# Patient Record
Sex: FEMALE | Race: WHITE
[De-identification: names, ages, dates, MRNs, and addresses within clinical notes are randomized per-mention and may not be internally consistent; named-entity substitution may affect disease eponyms.]

---

## 2017-04-06 ENCOUNTER — HOSPITAL ENCOUNTER (OUTPATIENT)
Dept: HOSPITAL 61 - OPS | Age: 72
Discharge: HOME | End: 2017-04-06
Attending: FAMILY MEDICINE
Payer: COMMERCIAL

## 2017-04-06 VITALS — DIASTOLIC BLOOD PRESSURE: 59 MMHG | SYSTOLIC BLOOD PRESSURE: 121 MMHG

## 2017-04-06 DIAGNOSIS — E78.2: ICD-10-CM

## 2017-04-06 DIAGNOSIS — Z13.1: Primary | ICD-10-CM

## 2017-04-06 DIAGNOSIS — I10: ICD-10-CM

## 2017-04-06 PROCEDURE — 96365 THER/PROPH/DIAG IV INF INIT: CPT

## 2017-10-19 ENCOUNTER — HOSPITAL ENCOUNTER (OUTPATIENT)
Dept: HOSPITAL 61 - CCL | Age: 72
Discharge: HOME | End: 2017-10-19
Attending: INTERNAL MEDICINE
Payer: COMMERCIAL

## 2017-10-19 VITALS — DIASTOLIC BLOOD PRESSURE: 58 MMHG | SYSTOLIC BLOOD PRESSURE: 140 MMHG

## 2017-10-19 VITALS — SYSTOLIC BLOOD PRESSURE: 124 MMHG | DIASTOLIC BLOOD PRESSURE: 55 MMHG

## 2017-10-19 VITALS — DIASTOLIC BLOOD PRESSURE: 52 MMHG | SYSTOLIC BLOOD PRESSURE: 124 MMHG

## 2017-10-19 VITALS — DIASTOLIC BLOOD PRESSURE: 53 MMHG | SYSTOLIC BLOOD PRESSURE: 112 MMHG

## 2017-10-19 VITALS — SYSTOLIC BLOOD PRESSURE: 118 MMHG | DIASTOLIC BLOOD PRESSURE: 58 MMHG

## 2017-10-19 VITALS — BODY MASS INDEX: 20.43 KG/M2 | HEIGHT: 62 IN | WEIGHT: 111 LBS

## 2017-10-19 VITALS — DIASTOLIC BLOOD PRESSURE: 54 MMHG | SYSTOLIC BLOOD PRESSURE: 106 MMHG

## 2017-10-19 VITALS — SYSTOLIC BLOOD PRESSURE: 114 MMHG | DIASTOLIC BLOOD PRESSURE: 59 MMHG

## 2017-10-19 VITALS — SYSTOLIC BLOOD PRESSURE: 145 MMHG | DIASTOLIC BLOOD PRESSURE: 62 MMHG

## 2017-10-19 VITALS — DIASTOLIC BLOOD PRESSURE: 54 MMHG | SYSTOLIC BLOOD PRESSURE: 116 MMHG

## 2017-10-19 VITALS — SYSTOLIC BLOOD PRESSURE: 105 MMHG | DIASTOLIC BLOOD PRESSURE: 58 MMHG

## 2017-10-19 VITALS — SYSTOLIC BLOOD PRESSURE: 133 MMHG | DIASTOLIC BLOOD PRESSURE: 58 MMHG

## 2017-10-19 VITALS — DIASTOLIC BLOOD PRESSURE: 60 MMHG | SYSTOLIC BLOOD PRESSURE: 114 MMHG

## 2017-10-19 DIAGNOSIS — J44.9: ICD-10-CM

## 2017-10-19 DIAGNOSIS — E78.00: ICD-10-CM

## 2017-10-19 DIAGNOSIS — E78.5: ICD-10-CM

## 2017-10-19 DIAGNOSIS — F17.200: ICD-10-CM

## 2017-10-19 DIAGNOSIS — I70.213: Primary | ICD-10-CM

## 2017-10-19 DIAGNOSIS — Z72.0: ICD-10-CM

## 2017-10-19 DIAGNOSIS — Z96.652: ICD-10-CM

## 2017-10-19 DIAGNOSIS — I10: ICD-10-CM

## 2017-10-19 LAB
ANION GAP SERPL CALC-SCNC: 8 MMOL/L (ref 6–14)
BUN SERPL-MCNC: 24 MG/DL (ref 7–20)
CALCIUM SERPL-MCNC: 9.7 MG/DL (ref 8.5–10.1)
CHLORIDE SERPL-SCNC: 103 MMOL/L (ref 98–107)
CO2 SERPL-SCNC: 31 MMOL/L (ref 21–32)
CREAT SERPL-MCNC: 1.1 MG/DL (ref 0.6–1)
ERYTHROCYTE [DISTWIDTH] IN BLOOD BY AUTOMATED COUNT: 14.1 % (ref 11.5–14.5)
GFR SERPLBLD BASED ON 1.73 SQ M-ARVRAT: 48.6 ML/MIN
GLUCOSE SERPL-MCNC: 89 MG/DL (ref 70–99)
HCT VFR BLD CALC: 43.6 % (ref 36–47)
HGB BLD-MCNC: 14.6 G/DL (ref 12–15.5)
INR PPP: 1 (ref 0.8–1.1)
MCH RBC QN AUTO: 31 PG (ref 25–35)
MCHC RBC AUTO-ENTMCNC: 34 G/DL (ref 31–37)
MCV RBC AUTO: 92 FL (ref 79–100)
PLATELET # BLD AUTO: 234 X10^3/UL (ref 140–400)
POTASSIUM SERPL-SCNC: 4.2 MMOL/L (ref 3.5–5.1)
PROTHROMBIN TIME: 12.1 SEC (ref 11.7–14)
RBC # BLD AUTO: 4.73 X10^6/UL (ref 3.5–5.4)
SODIUM SERPL-SCNC: 142 MMOL/L (ref 136–145)
WBC # BLD AUTO: 7.1 X10^3/UL (ref 4–11)

## 2017-10-19 PROCEDURE — C1769 GUIDE WIRE: HCPCS

## 2017-10-19 PROCEDURE — 99152 MOD SED SAME PHYS/QHP 5/>YRS: CPT

## 2017-10-19 PROCEDURE — 36415 COLL VENOUS BLD VENIPUNCTURE: CPT

## 2017-10-19 PROCEDURE — 85610 PROTHROMBIN TIME: CPT

## 2017-10-19 PROCEDURE — 85347 COAGULATION TIME ACTIVATED: CPT

## 2017-10-19 PROCEDURE — 85027 COMPLETE CBC AUTOMATED: CPT

## 2017-10-19 PROCEDURE — C1892 INTRO/SHEATH,FIXED,PEEL-AWAY: HCPCS

## 2017-10-19 PROCEDURE — 80048 BASIC METABOLIC PNL TOTAL CA: CPT

## 2017-10-19 PROCEDURE — 99153 MOD SED SAME PHYS/QHP EA: CPT

## 2017-10-19 PROCEDURE — 75630 X-RAY AORTA LEG ARTERIES: CPT

## 2017-10-19 NOTE — CARD
--------------- APPROVED REPORT --------------





Patient StatusOUT-PATIENT

Cardiac Technologist:      Mischelle L Rozine, RT (R)

Procedure(s) performed: Moderate Sedation: 63 Minutes

Aortogram with bilateral iliofemoral run-off

External iliac, common femoral catheter placement (bilateral)



HISTORY

The patient is a 72 year-old female with a history of : hypertension, dyslipidemia.



INDICATION FOR PROCEDURE

The indication(s) include : Unilateral claudication: Right.



PROCEDURE NARRATIVE

After appropriate informed consent, the patient was brought to the cath lab and placed in the supine 
position. Preprocedural timeout was completed and confirmed the right patient and procedure. The bila
teral groins were prepped and draped in usual sterile fashion. Moderate sedation acheived with Fentan
yl and Versed. The patient received 2000 units of Heparin for anticoagulation. 



Access: Under lidocaine local anesthesia, a 5Fr introducer sheath was placed in the LCFA via the mandie
fied seldinger technique with a J-tipped guidewire and an 18g needle. Diagnostic angiography was then
 performed using a 5Fr Omniflush catheter with digital subtraction angiography. Next, the contralater
al (RCFA) was accessed with the aid of the Omniflush catheter and a J-tipped guidewire. The omniflush
 was then exchanged for a long angled glide catheter which was then placed in the RCFA. Repeat right 
lower extremity with DSA was performed. Subsequently, the glidecatheter was used to do a pullback man
euver on the AIDA stenosis. No significant stenosis was identified at the levels of the common iliac 
arteries and the left external iliac artery. 



FINDINGS: 

AO: 154/86



AORTA: Moderate adventitial calcification with a focal saccular aneurysm extending over the RCIA. 

RENAL arteries: Single right and left renal arteries were identified without significant disease. 

RCIA: Mild diffuse irregularities of upto 20%. 

REIA: No significant disease. 

RIIA: No significant disease. 

RCFA: No significant disease. 

RSFA: Distal SFA occlusion, the remainder of the vessel is small in caliber but no high grade disease
 is identified. 

RPOP: Occlusion at the P1 segment of the popliteal artery with reconstitution via geniculate and medi
al thigh collaterals at the P2 segment. 

RAT: No significant disease. 

RTP trunk: No significant disease. 



LCIA: Mild diffuse irregularities of upto 20%. 

AIDA: There is an ostial/proximal 50% eccentric stenosis. *No significant pullback gradient across th
e lesion, approximately 12 mm Hg. 

LIIA: Has an ostial/proximal 50% stenosis. 

LCFA: No significant disease. 

LSFA: No significant disease. 

LPOP: No significant disease. 

LAT: No significant disease. 

LTP trunk: No significant disease. 



At case completion, the left sided sheath was removed via manual compression. There were no acute com
plications. 



Conclusion

1. Lund category 3 claudication (R>L)

2. RSFA/P1 segment occlusion, moderate left sided external iliac disease



Recommendations

Plan for staged PVI with a pedal approach for treatment of RSFA disease given location of stenosis an
d CKD.

## 2017-10-31 ENCOUNTER — HOSPITAL ENCOUNTER (OUTPATIENT)
Dept: HOSPITAL 61 - CCL | Age: 72
Discharge: HOME | End: 2017-10-31
Attending: INTERNAL MEDICINE
Payer: COMMERCIAL

## 2017-10-31 VITALS — SYSTOLIC BLOOD PRESSURE: 118 MMHG | DIASTOLIC BLOOD PRESSURE: 58 MMHG

## 2017-10-31 VITALS — DIASTOLIC BLOOD PRESSURE: 49 MMHG | SYSTOLIC BLOOD PRESSURE: 98 MMHG

## 2017-10-31 VITALS — WEIGHT: 111 LBS | HEIGHT: 62 IN | BODY MASS INDEX: 20.43 KG/M2

## 2017-10-31 VITALS — SYSTOLIC BLOOD PRESSURE: 114 MMHG | DIASTOLIC BLOOD PRESSURE: 61 MMHG

## 2017-10-31 VITALS — DIASTOLIC BLOOD PRESSURE: 61 MMHG | SYSTOLIC BLOOD PRESSURE: 114 MMHG

## 2017-10-31 VITALS — SYSTOLIC BLOOD PRESSURE: 107 MMHG | DIASTOLIC BLOOD PRESSURE: 75 MMHG

## 2017-10-31 VITALS — DIASTOLIC BLOOD PRESSURE: 52 MMHG | SYSTOLIC BLOOD PRESSURE: 119 MMHG

## 2017-10-31 VITALS — SYSTOLIC BLOOD PRESSURE: 106 MMHG | DIASTOLIC BLOOD PRESSURE: 60 MMHG

## 2017-10-31 VITALS — DIASTOLIC BLOOD PRESSURE: 47 MMHG | SYSTOLIC BLOOD PRESSURE: 95 MMHG

## 2017-10-31 VITALS — DIASTOLIC BLOOD PRESSURE: 52 MMHG | SYSTOLIC BLOOD PRESSURE: 111 MMHG

## 2017-10-31 VITALS — DIASTOLIC BLOOD PRESSURE: 58 MMHG | SYSTOLIC BLOOD PRESSURE: 102 MMHG

## 2017-10-31 DIAGNOSIS — E78.00: ICD-10-CM

## 2017-10-31 DIAGNOSIS — I70.213: Primary | ICD-10-CM

## 2017-10-31 DIAGNOSIS — I10: ICD-10-CM

## 2017-10-31 DIAGNOSIS — J44.9: ICD-10-CM

## 2017-10-31 DIAGNOSIS — Z96.652: ICD-10-CM

## 2017-10-31 DIAGNOSIS — F17.200: ICD-10-CM

## 2017-10-31 LAB
ANION GAP SERPL CALC-SCNC: 8 MMOL/L (ref 6–14)
BUN SERPL-MCNC: 28 MG/DL (ref 7–20)
CALCIUM SERPL-MCNC: 9.6 MG/DL (ref 8.5–10.1)
CHLORIDE SERPL-SCNC: 103 MMOL/L (ref 98–107)
CO2 SERPL-SCNC: 29 MMOL/L (ref 21–32)
CREAT SERPL-MCNC: 1.4 MG/DL (ref 0.6–1)
ERYTHROCYTE [DISTWIDTH] IN BLOOD BY AUTOMATED COUNT: 14.3 % (ref 11.5–14.5)
GFR SERPLBLD BASED ON 1.73 SQ M-ARVRAT: 36.8 ML/MIN
GLUCOSE SERPL-MCNC: 91 MG/DL (ref 70–99)
HCT VFR BLD CALC: 41.7 % (ref 36–47)
HGB BLD-MCNC: 14 G/DL (ref 12–15.5)
INR PPP: 1 (ref 0.8–1.1)
MCH RBC QN AUTO: 31 PG (ref 25–35)
MCHC RBC AUTO-ENTMCNC: 34 G/DL (ref 31–37)
MCV RBC AUTO: 92 FL (ref 79–100)
PLATELET # BLD AUTO: 259 X10^3/UL (ref 140–400)
POTASSIUM SERPL-SCNC: 3.7 MMOL/L (ref 3.5–5.1)
PROTHROMBIN TIME: 12.6 SEC (ref 11.7–14)
RBC # BLD AUTO: 4.53 X10^6/UL (ref 3.5–5.4)
SODIUM SERPL-SCNC: 140 MMOL/L (ref 136–145)
WBC # BLD AUTO: 9.9 X10^3/UL (ref 4–11)

## 2017-10-31 PROCEDURE — 85610 PROTHROMBIN TIME: CPT

## 2017-10-31 PROCEDURE — 99153 MOD SED SAME PHYS/QHP EA: CPT

## 2017-10-31 PROCEDURE — C1885 CATH, TRANSLUMIN ANGIO LASER: HCPCS

## 2017-10-31 PROCEDURE — C2623 CATH, TRANSLUMIN, DRUG-COAT: HCPCS

## 2017-10-31 PROCEDURE — 85027 COMPLETE CBC AUTOMATED: CPT

## 2017-10-31 PROCEDURE — 36415 COLL VENOUS BLD VENIPUNCTURE: CPT

## 2017-10-31 PROCEDURE — 37224: CPT

## 2017-10-31 PROCEDURE — 80048 BASIC METABOLIC PNL TOTAL CA: CPT

## 2017-10-31 PROCEDURE — C1769 GUIDE WIRE: HCPCS

## 2017-10-31 PROCEDURE — C1892 INTRO/SHEATH,FIXED,PEEL-AWAY: HCPCS

## 2017-10-31 PROCEDURE — C1876 STENT, NON-COA/NON-COV W/DEL: HCPCS

## 2017-10-31 PROCEDURE — 99152 MOD SED SAME PHYS/QHP 5/>YRS: CPT

## 2017-10-31 PROCEDURE — 37226: CPT

## 2017-10-31 NOTE — PDOC
MODERATE SEDATION ASSESSMENT


RISKS/ALTERNATIVES


Risks/Alternatives


Risks and alternatives of this type of sedation and procedure discussed with:


RISK/ALTERNATIVES:  Patient





H & P ON CHART


H & P


H & P on chart and reviewed for co-morbid conditions and appropriate labs.


H&P ON CHART:  Yes





PREGNANCY STATUS


PREG STATUS ASSESSED:  N/A





MEDS/ALLERGIES REVIEWED


Meds/Allergies Reviewed


Medications and Allergies including time and route of recently administered 

narcotics and sedatives.


MEDS/ALLERGIES REVIEWED:  Yes





ASA RATING


ASA RATING:  II





AIRWAY ASSESSMENT


Airway Assessment


Airway patency, oral function limitations, presence  of caps, crowns, dentures, 

partials, and ability to extend neck assessed.


AIRWAY ASSESSMENT:  Yes





MALLAMPATI SCORE


MALLAMPATI SCORE:  II





PRE-SEDATION ASSESSMENT


PRE-SEDATION ASSESSMENT:  Yes











LANEY HENSLEY MD Oct 31, 2017 10:48

## 2017-10-31 NOTE — CARD
--------------- APPROVED REPORT --------------





Procedure(s) performed: SEDATION TIME 90 MINUTES

Fem-pop PTA with stent placement



HISTORY

The patient is a 72 year-old female with a history of : dyslipidemia.



INDICATION

The indication(s) include : Bilateral (R>L) selena category 3 claudication. .



PROCEDURE NARRATIVE

After appropriate informed consent, the patient was brought to the cath lab. The right foot was prepp
ed and draped in usual sterile fashion. 



Under 2% lidocaine local anesthesia, a 5Fr micropuncture kit was used to enter the DP artery with ult
rasound guidance. Next, a 5Fr sheath was placed and 4000 units of heparin was given. Using a short 60
 cm angled glide catheter and a glidewire the distal SFA occlusion was crossed. 



Diagnostic angiography confirmed intraluminal placement. Next, the glidewire was exchanged for a Comm
and 0.018 Wire. 



Balloon angioplasty was then performed for 3 minutes each with a 4.0/40 and 5.0/40 mm balloons. Post-
angioplasty angiography revealed a non-flow limiting dissection. Next, the lesion was angioplastied w
ith a InPACT Paclitaxel coated balloon (5.0/60mm) at nominal pressures. Due to persistent dissection,
 a Supera 5.0/60 mm stent was used to cover the dissection and post-dilated with a 5.0/40 mm balloon 
at 10 ramon. 



Post-PVI angiography revealed excellent stent expansion. There was wire induced pseudostenosis in the
 P3 segment of the popliteal artery, which was then resolved with NTG and removal of the guide wire. 
The distal run-off was widely patent showing 3 vessel brisk flow. Multiple doses of NTG and Verapamil
 were given to help prevent vasospasm at the access site. 



Patient was given 600mg of Plavix at case completion and the sheath was removed with placement of a T
erumo radial band and hemostasis was achieved with good capillay refill 



Conclusion

1. Successful  recanalization of the right distal SFA/P1 segment via the pedal approach. 

2. Lesion treated with 5.0/60 Paclitaxel coated balloon and stented with a 5.0/60 mm Supera deployed 
at approximately nominal length. 



Recommendations

ASA 81mg daily

Plavix 75mg daily

## 2018-01-15 ENCOUNTER — HOSPITAL ENCOUNTER (OUTPATIENT)
Dept: HOSPITAL 61 - KCIC | Age: 73
Discharge: HOME | End: 2018-01-15
Attending: PHYSICIAN ASSISTANT
Payer: COMMERCIAL

## 2018-01-15 DIAGNOSIS — M54.32: ICD-10-CM

## 2018-01-15 DIAGNOSIS — G57.91: ICD-10-CM

## 2018-01-15 DIAGNOSIS — M54.31: Primary | ICD-10-CM

## 2018-01-15 PROCEDURE — 72110 X-RAY EXAM L-2 SPINE 4/>VWS: CPT

## 2018-03-09 ENCOUNTER — HOSPITAL ENCOUNTER (OUTPATIENT)
Dept: HOSPITAL 61 - US | Age: 73
Discharge: HOME | End: 2018-03-09
Attending: INTERNAL MEDICINE
Payer: COMMERCIAL

## 2018-03-09 DIAGNOSIS — R09.89: Primary | ICD-10-CM

## 2018-03-09 PROCEDURE — 93880 EXTRACRANIAL BILAT STUDY: CPT

## 2018-06-19 ENCOUNTER — HOSPITAL ENCOUNTER (OUTPATIENT)
Dept: HOSPITAL 61 - KCIC US | Age: 73
Discharge: HOME | End: 2018-06-19
Attending: INTERNAL MEDICINE
Payer: COMMERCIAL

## 2018-06-19 DIAGNOSIS — I70.213: Primary | ICD-10-CM

## 2018-06-19 PROCEDURE — 93925 LOWER EXTREMITY STUDY: CPT

## 2018-06-25 ENCOUNTER — HOSPITAL ENCOUNTER (OUTPATIENT)
Dept: HOSPITAL 61 - CCL | Age: 73
Discharge: HOME | End: 2018-06-25
Attending: INTERNAL MEDICINE
Payer: COMMERCIAL

## 2018-06-25 DIAGNOSIS — I70.213: Primary | ICD-10-CM

## 2018-06-25 DIAGNOSIS — J44.9: ICD-10-CM

## 2018-06-25 DIAGNOSIS — I12.9: ICD-10-CM

## 2018-06-25 DIAGNOSIS — Z86.010: ICD-10-CM

## 2018-06-25 DIAGNOSIS — Z98.890: ICD-10-CM

## 2018-06-25 DIAGNOSIS — M81.0: ICD-10-CM

## 2018-06-25 DIAGNOSIS — Z96.652: ICD-10-CM

## 2018-06-25 DIAGNOSIS — Z79.01: ICD-10-CM

## 2018-06-25 DIAGNOSIS — I70.92: ICD-10-CM

## 2018-06-25 DIAGNOSIS — E78.00: ICD-10-CM

## 2018-06-25 DIAGNOSIS — F32.9: ICD-10-CM

## 2018-06-25 DIAGNOSIS — N18.3: ICD-10-CM

## 2018-06-25 LAB
ANION GAP SERPL CALC-SCNC: 9 MMOL/L (ref 6–14)
BLOOD UREA NITROGEN: 33 MG/DL (ref 7–20)
CALCIUM: 9.8 MG/DL (ref 8.5–10.1)
CHLORIDE: 104 MMOL/L (ref 98–107)
CO2 SERPL-SCNC: 28 MMOL/L (ref 21–32)
CREAT SERPL-MCNC: 1.3 MG/DL (ref 0.6–1)
GFR SERPLBLD BASED ON 1.73 SQ M-ARVRAT: 40 ML/MIN
GLUCOSE SERPL-MCNC: 94 MG/DL (ref 70–99)
HCG SERPL-ACNC: 9.8 X10^3/UL (ref 4–11)
HEMATOCRIT: 42.3 % (ref 36–47)
HEMOGLOBIN: 14.3 G/DL (ref 12–15.5)
INR: 0.9 (ref 0.8–1.1)
MEAN CORPUSCULAR HEMOGLOBIN: 32 PG (ref 25–35)
MEAN CORPUSCULAR HGB CONC: 34 G/DL (ref 31–37)
MEAN CORPUSCULAR VOLUME: 93 FL (ref 79–100)
PARTIAL THROMBOPLASTIN TIME: 33 SEC (ref 24–38)
PLATELET COUNT: 238 X10^3/UL (ref 140–400)
POTASSIUM SERPL-SCNC: 4.1 MMOL/L (ref 3.5–5.1)
PROTHROMBIN TIME PATIENT: 12 SEC (ref 11.7–14)
RED BLOOD COUNT: 4.53 X10^6/UL (ref 3.5–5.4)
RED CELL DISTRIBUTION WIDTH: 14.4 % (ref 11.5–14.5)
SODIUM: 141 MMOL/L (ref 136–145)

## 2018-06-25 PROCEDURE — 85347 COAGULATION TIME ACTIVATED: CPT

## 2018-06-25 PROCEDURE — 85610 PROTHROMBIN TIME: CPT

## 2018-06-25 PROCEDURE — 99153 MOD SED SAME PHYS/QHP EA: CPT

## 2018-06-25 PROCEDURE — 75716 ARTERY X-RAYS ARMS/LEGS: CPT

## 2018-06-25 PROCEDURE — 80048 BASIC METABOLIC PNL TOTAL CA: CPT

## 2018-06-25 PROCEDURE — 85730 THROMBOPLASTIN TIME PARTIAL: CPT

## 2018-06-25 PROCEDURE — 36415 COLL VENOUS BLD VENIPUNCTURE: CPT

## 2018-06-25 PROCEDURE — 36246 INS CATH ABD/L-EXT ART 2ND: CPT

## 2018-06-25 PROCEDURE — 85027 COMPLETE CBC AUTOMATED: CPT

## 2018-06-25 PROCEDURE — 75630 X-RAY AORTA LEG ARTERIES: CPT

## 2018-06-25 PROCEDURE — 99152 MOD SED SAME PHYS/QHP 5/>YRS: CPT

## 2018-06-25 RX ADMIN — HEPARIN SODIUM IN SODIUM CHLORIDE 1 UNIT: 200 INJECTION INTRAVENOUS at 08:57

## 2018-06-25 RX ADMIN — BACITRACIN 1 MLS/HR: 5000 INJECTION, POWDER, FOR SOLUTION INTRAMUSCULAR at 07:00

## 2018-06-25 RX ADMIN — MIDAZOLAM HYDROCHLORIDE 1 MG: 1 INJECTION, SOLUTION INTRAMUSCULAR; INTRAVENOUS at 08:57

## 2018-06-25 RX ADMIN — IODIXANOL 1 ML: 320 INJECTION, SOLUTION INTRAVASCULAR at 08:58

## 2018-06-25 RX ADMIN — FENTANYL CITRATE 1 MCG: 50 INJECTION INTRAMUSCULAR; INTRAVENOUS at 08:59

## 2018-06-25 RX ADMIN — HEPARIN SODIUM 1 UNIT: 1000 INJECTION, SOLUTION INTRAVENOUS; SUBCUTANEOUS at 09:00

## 2018-06-25 RX ADMIN — LIDOCAINE HYDROCHLORIDE 1 ML: 20 INJECTION, SOLUTION INFILTRATION; PERINEURAL at 08:58

## 2018-07-10 ENCOUNTER — HOSPITAL ENCOUNTER (INPATIENT)
Dept: HOSPITAL 61 - OPSVCIP | Age: 73
LOS: 3 days | Discharge: HOME | DRG: 253 | End: 2018-07-13
Attending: SURGERY | Admitting: SURGERY
Payer: COMMERCIAL

## 2018-07-10 DIAGNOSIS — I77.9: ICD-10-CM

## 2018-07-10 DIAGNOSIS — Z96.659: ICD-10-CM

## 2018-07-10 DIAGNOSIS — I73.9: Primary | ICD-10-CM

## 2018-07-10 DIAGNOSIS — R09.02: ICD-10-CM

## 2018-07-10 DIAGNOSIS — N17.9: ICD-10-CM

## 2018-07-10 DIAGNOSIS — E78.00: ICD-10-CM

## 2018-07-10 DIAGNOSIS — E78.5: ICD-10-CM

## 2018-07-10 DIAGNOSIS — I10: ICD-10-CM

## 2018-07-10 DIAGNOSIS — Z82.49: ICD-10-CM

## 2018-07-10 DIAGNOSIS — F17.200: ICD-10-CM

## 2018-07-10 LAB
ADD MAN DIFF?: NO
ANION GAP SERPL CALC-SCNC: 6 MMOL/L (ref 6–14)
BASO #: 0.1 X10^3/UL (ref 0–0.2)
BASO %: 1 % (ref 0–3)
BLOOD UREA NITROGEN: 24 MG/DL (ref 7–20)
CALCIUM: 8.9 MG/DL (ref 8.5–10.1)
CHLORIDE: 107 MMOL/L (ref 98–107)
CO2 SERPL-SCNC: 27 MMOL/L (ref 21–32)
CREAT SERPL-MCNC: 1.2 MG/DL (ref 0.6–1)
EOS #: 0.5 X10^3/UL (ref 0–0.7)
EOS %: 8 % (ref 0–3)
GFR SERPLBLD BASED ON 1.73 SQ M-ARVRAT: 43.9 ML/MIN
GLUCOSE SERPL-MCNC: 93 MG/DL (ref 70–99)
HCG SERPL-ACNC: 6.7 X10^3/UL (ref 4–11)
HEMATOCRIT: 38.6 % (ref 36–47)
HEMOGLOBIN: 13 G/DL (ref 12–15.5)
INR: 1 (ref 0.8–1.1)
LYMPH #: 2 X10^3/UL (ref 1–4.8)
LYMPH %: 30 % (ref 24–48)
MEAN CORPUSCULAR HEMOGLOBIN: 32 PG (ref 25–35)
MEAN CORPUSCULAR HGB CONC: 34 G/DL (ref 31–37)
MEAN CORPUSCULAR VOLUME: 93 FL (ref 79–100)
MONO #: 0.6 X10^3/UL (ref 0–1.1)
MONO %: 9 % (ref 0–9)
NEUT #: 3.5 X10^3UL (ref 1.8–7.7)
NEUT %: 52 % (ref 31–73)
PARTIAL THROMBOPLASTIN TIME: 31 SEC (ref 24–38)
PLATELET COUNT: 217 X10^3/UL (ref 140–400)
POTASSIUM SERPL-SCNC: 3.9 MMOL/L (ref 3.5–5.1)
PROTHROMBIN TIME PATIENT: 12.3 SEC (ref 11.7–14)
RED BLOOD COUNT: 4.13 X10^6/UL (ref 3.5–5.4)
RED CELL DISTRIBUTION WIDTH: 14.2 % (ref 11.5–14.5)
SODIUM: 140 MMOL/L (ref 136–145)

## 2018-07-10 PROCEDURE — 97165 OT EVAL LOW COMPLEX 30 MIN: CPT

## 2018-07-10 PROCEDURE — 94640 AIRWAY INHALATION TREATMENT: CPT

## 2018-07-10 PROCEDURE — 041K09L BYPASS RIGHT FEMORAL ARTERY TO POPLITEAL ARTERY WITH AUTOLOGOUS VENOUS TISSUE, OPEN APPROACH: ICD-10-PCS

## 2018-07-10 PROCEDURE — 83735 ASSAY OF MAGNESIUM: CPT

## 2018-07-10 PROCEDURE — 80048 BASIC METABOLIC PNL TOTAL CA: CPT

## 2018-07-10 PROCEDURE — 84100 ASSAY OF PHOSPHORUS: CPT

## 2018-07-10 PROCEDURE — 97161 PT EVAL LOW COMPLEX 20 MIN: CPT

## 2018-07-10 PROCEDURE — 94618 PULMONARY STRESS TESTING: CPT

## 2018-07-10 PROCEDURE — 85610 PROTHROMBIN TIME: CPT

## 2018-07-10 PROCEDURE — 99406 BEHAV CHNG SMOKING 3-10 MIN: CPT

## 2018-07-10 PROCEDURE — 85730 THROMBOPLASTIN TIME PARTIAL: CPT

## 2018-07-10 PROCEDURE — 94760 N-INVAS EAR/PLS OXIMETRY 1: CPT

## 2018-07-10 PROCEDURE — 06BP0ZZ EXCISION OF RIGHT SAPHENOUS VEIN, OPEN APPROACH: ICD-10-PCS

## 2018-07-10 PROCEDURE — 85025 COMPLETE CBC W/AUTO DIFF WBC: CPT

## 2018-07-10 PROCEDURE — 36415 COLL VENOUS BLD VENIPUNCTURE: CPT

## 2018-07-10 RX ADMIN — BACITRACIN 1 MLS/HR: 5000 INJECTION, POWDER, FOR SOLUTION INTRAMUSCULAR at 18:26

## 2018-07-10 RX ADMIN — FENTANYL CITRATE 1 MCG: 50 INJECTION INTRAMUSCULAR; INTRAVENOUS at 10:46

## 2018-07-10 RX ADMIN — BACITRACIN 1 MLS/HR: 5000 INJECTION, POWDER, FOR SOLUTION INTRAMUSCULAR at 06:00

## 2018-07-10 RX ADMIN — ALTEPLASE 1 MG: 2.2 INJECTION, POWDER, LYOPHILIZED, FOR SOLUTION INTRAVENOUS at 07:30

## 2018-07-10 RX ADMIN — FENTANYL CITRATE 1 MCG: 50 INJECTION INTRAMUSCULAR; INTRAVENOUS at 11:37

## 2018-07-10 RX ADMIN — GABAPENTIN 1 MG: 300 CAPSULE ORAL at 21:11

## 2018-07-10 RX ADMIN — IPRATROPIUM BROMIDE AND ALBUTEROL SULFATE 1 ML: .5; 3 SOLUTION RESPIRATORY (INHALATION) at 07:12

## 2018-07-10 RX ADMIN — FENTANYL CITRATE 1 MCG: 50 INJECTION INTRAMUSCULAR; INTRAVENOUS at 11:29

## 2018-07-10 RX ADMIN — SODIUM CHLORIDE, SODIUM LACTATE, POTASSIUM CHLORIDE, AND CALCIUM CHLORIDE 1 MLS/HR: .6; .31; .03; .02 INJECTION, SOLUTION INTRAVENOUS at 07:00

## 2018-07-10 RX ADMIN — FENTANYL CITRATE 1 MCG: 50 INJECTION INTRAMUSCULAR; INTRAVENOUS at 10:56

## 2018-07-10 RX ADMIN — LIDOCAINE HYDROCHLORIDE 1 ML: 10 INJECTION, SOLUTION EPIDURAL; INFILTRATION; INTRACAUDAL; PERINEURAL at 11:36

## 2018-07-10 RX ADMIN — GABAPENTIN 1 MG: 300 CAPSULE ORAL at 18:11

## 2018-07-10 RX ADMIN — OXYCODONE HYDROCHLORIDE AND ACETAMINOPHEN 1 TAB: 5; 325 TABLET ORAL at 18:18

## 2018-07-10 RX ADMIN — FENTANYL CITRATE 1 MCG: 50 INJECTION INTRAMUSCULAR; INTRAVENOUS at 20:09

## 2018-07-10 RX ADMIN — OXYCODONE HYDROCHLORIDE AND ACETAMINOPHEN 1 TAB: 5; 325 TABLET ORAL at 11:57

## 2018-07-10 RX ADMIN — LISINOPRIL 1 MG: 10 TABLET ORAL at 18:17

## 2018-07-11 LAB
ADD MAN DIFF?: NO
ANION GAP SERPL CALC-SCNC: 8 MMOL/L (ref 6–14)
BASO #: 0 X10^3/UL (ref 0–0.2)
BASO %: 0 % (ref 0–3)
BLOOD UREA NITROGEN: 18 MG/DL (ref 7–20)
CALCIUM: 8.7 MG/DL (ref 8.5–10.1)
CHLORIDE: 104 MMOL/L (ref 98–107)
CO2 SERPL-SCNC: 29 MMOL/L (ref 21–32)
CREAT SERPL-MCNC: 1.3 MG/DL (ref 0.6–1)
EOS #: 0.1 X10^3/UL (ref 0–0.7)
EOS %: 1 % (ref 0–3)
GFR SERPLBLD BASED ON 1.73 SQ M-ARVRAT: 40 ML/MIN
GLUCOSE SERPL-MCNC: 94 MG/DL (ref 70–99)
HCG SERPL-ACNC: 10 X10^3/UL (ref 4–11)
HEMATOCRIT: 35.3 % (ref 36–47)
HEMOGLOBIN: 12 G/DL (ref 12–15.5)
LYMPH #: 2 X10^3/UL (ref 1–4.8)
LYMPH %: 20 % (ref 24–48)
MAGNESIUM: 2 MG/DL (ref 1.8–2.4)
MEAN CORPUSCULAR HEMOGLOBIN: 32 PG (ref 25–35)
MEAN CORPUSCULAR HGB CONC: 34 G/DL (ref 31–37)
MEAN CORPUSCULAR VOLUME: 93 FL (ref 79–100)
MONO #: 0.6 X10^3/UL (ref 0–1.1)
MONO %: 6 % (ref 0–9)
NEUT #: 7.2 X10^3UL (ref 1.8–7.7)
NEUT %: 72 % (ref 31–73)
PHOSPHORUS: 3.5 MG/DL (ref 2.6–4.7)
PLATELET COUNT: 204 X10^3/UL (ref 140–400)
POTASSIUM SERPL-SCNC: 3.9 MMOL/L (ref 3.5–5.1)
RED BLOOD COUNT: 3.78 X10^6/UL (ref 3.5–5.4)
RED CELL DISTRIBUTION WIDTH: 14.6 % (ref 11.5–14.5)
SODIUM: 141 MMOL/L (ref 136–145)

## 2018-07-11 RX ADMIN — OXYCODONE HYDROCHLORIDE AND ACETAMINOPHEN 1 TAB: 5; 325 TABLET ORAL at 07:44

## 2018-07-11 RX ADMIN — BACITRACIN 1 MLS/HR: 5000 INJECTION, POWDER, FOR SOLUTION INTRAMUSCULAR at 01:59

## 2018-07-11 RX ADMIN — CALCIUM CARBONATE (ANTACID) CHEW TAB 500 MG 1 MG: 500 CHEW TAB at 07:44

## 2018-07-11 RX ADMIN — GABAPENTIN 1 MG: 300 CAPSULE ORAL at 07:44

## 2018-07-11 RX ADMIN — OXYCODONE HYDROCHLORIDE AND ACETAMINOPHEN 1 TAB: 5; 325 TABLET ORAL at 17:11

## 2018-07-11 RX ADMIN — GABAPENTIN 1 MG: 300 CAPSULE ORAL at 14:41

## 2018-07-11 RX ADMIN — ASPIRIN 1 MG: 325 TABLET, DELAYED RELEASE ORAL at 07:44

## 2018-07-11 RX ADMIN — BACITRACIN 1 MLS/HR: 5000 INJECTION, POWDER, FOR SOLUTION INTRAMUSCULAR at 20:17

## 2018-07-11 RX ADMIN — LISINOPRIL 1 MG: 10 TABLET ORAL at 09:53

## 2018-07-11 RX ADMIN — OXYCODONE HYDROCHLORIDE AND ACETAMINOPHEN 1 TAB: 5; 325 TABLET ORAL at 02:05

## 2018-07-11 RX ADMIN — GABAPENTIN 1 MG: 300 CAPSULE ORAL at 20:26

## 2018-07-11 RX ADMIN — CALCIUM CARBONATE-VITAMIN D TAB 500 MG-200 UNIT 1 TAB: 500-200 TAB at 09:52

## 2018-07-11 RX ADMIN — OXYCODONE HYDROCHLORIDE AND ACETAMINOPHEN 1 TAB: 5; 325 TABLET ORAL at 12:13

## 2018-07-11 RX ADMIN — BACITRACIN 1 MLS/HR: 5000 INJECTION, POWDER, FOR SOLUTION INTRAMUSCULAR at 07:49

## 2018-07-11 RX ADMIN — MULTIPLE VITAMINS W/ MINERALS TAB 1 TAB: TAB at 07:43

## 2018-07-12 LAB
ADD MAN DIFF?: NO
ANION GAP SERPL CALC-SCNC: 8 MMOL/L (ref 6–14)
BASO #: 0 X10^3/UL (ref 0–0.2)
BASO %: 1 % (ref 0–3)
BLOOD UREA NITROGEN: 22 MG/DL (ref 7–20)
CALCIUM: 7.7 MG/DL (ref 8.5–10.1)
CHLORIDE: 106 MMOL/L (ref 98–107)
CO2 SERPL-SCNC: 26 MMOL/L (ref 21–32)
CREAT SERPL-MCNC: 1.2 MG/DL (ref 0.6–1)
EOS #: 0.2 X10^3/UL (ref 0–0.7)
EOS %: 3 % (ref 0–3)
GFR SERPLBLD BASED ON 1.73 SQ M-ARVRAT: 43.9 ML/MIN
GLUCOSE SERPL-MCNC: 118 MG/DL (ref 70–99)
HCG SERPL-ACNC: 7.7 X10^3/UL (ref 4–11)
HEMATOCRIT: 32.1 % (ref 36–47)
HEMOGLOBIN: 10.9 G/DL (ref 12–15.5)
LYMPH #: 1.4 X10^3/UL (ref 1–4.8)
LYMPH %: 18 % (ref 24–48)
MEAN CORPUSCULAR HEMOGLOBIN: 32 PG (ref 25–35)
MEAN CORPUSCULAR HGB CONC: 34 G/DL (ref 31–37)
MEAN CORPUSCULAR VOLUME: 94 FL (ref 79–100)
MONO #: 0.5 X10^3/UL (ref 0–1.1)
MONO %: 7 % (ref 0–9)
NEUT #: 5.5 X10^3UL (ref 1.8–7.7)
NEUT %: 72 % (ref 31–73)
PLATELET COUNT: 167 X10^3/UL (ref 140–400)
POTASSIUM SERPL-SCNC: 4 MMOL/L (ref 3.5–5.1)
RED BLOOD COUNT: 3.42 X10^6/UL (ref 3.5–5.4)
RED CELL DISTRIBUTION WIDTH: 14.2 % (ref 11.5–14.5)
SODIUM: 140 MMOL/L (ref 136–145)

## 2018-07-12 RX ADMIN — ASPIRIN 1 MG: 325 TABLET, DELAYED RELEASE ORAL at 08:27

## 2018-07-12 RX ADMIN — GABAPENTIN 1 MG: 300 CAPSULE ORAL at 08:25

## 2018-07-12 RX ADMIN — OXYCODONE HYDROCHLORIDE AND ACETAMINOPHEN 1 TAB: 5; 325 TABLET ORAL at 20:17

## 2018-07-12 RX ADMIN — MULTIPLE VITAMINS W/ MINERALS TAB 1 TAB: TAB at 08:27

## 2018-07-12 RX ADMIN — OXYCODONE HYDROCHLORIDE AND ACETAMINOPHEN 1 TAB: 5; 325 TABLET ORAL at 09:35

## 2018-07-12 RX ADMIN — BACITRACIN 1 MLS/HR: 5000 INJECTION, POWDER, FOR SOLUTION INTRAMUSCULAR at 08:28

## 2018-07-12 RX ADMIN — OXYCODONE HYDROCHLORIDE AND ACETAMINOPHEN 1 TAB: 5; 325 TABLET ORAL at 05:58

## 2018-07-12 RX ADMIN — GABAPENTIN 1 MG: 300 CAPSULE ORAL at 20:17

## 2018-07-12 RX ADMIN — OXYCODONE HYDROCHLORIDE AND ACETAMINOPHEN 1 TAB: 5; 325 TABLET ORAL at 14:50

## 2018-07-12 RX ADMIN — LISINOPRIL 1 MG: 10 TABLET ORAL at 08:26

## 2018-07-12 RX ADMIN — GABAPENTIN 1 MG: 300 CAPSULE ORAL at 14:48

## 2018-07-12 RX ADMIN — CALCIUM CARBONATE-VITAMIN D TAB 500 MG-200 UNIT 1 TAB: 500-200 TAB at 08:25

## 2018-07-13 VITALS
SYSTOLIC BLOOD PRESSURE: 135 MMHG | DIASTOLIC BLOOD PRESSURE: 59 MMHG | DIASTOLIC BLOOD PRESSURE: 59 MMHG | SYSTOLIC BLOOD PRESSURE: 135 MMHG | SYSTOLIC BLOOD PRESSURE: 135 MMHG | DIASTOLIC BLOOD PRESSURE: 59 MMHG | SYSTOLIC BLOOD PRESSURE: 135 MMHG | SYSTOLIC BLOOD PRESSURE: 135 MMHG | DIASTOLIC BLOOD PRESSURE: 59 MMHG | DIASTOLIC BLOOD PRESSURE: 59 MMHG | SYSTOLIC BLOOD PRESSURE: 135 MMHG | SYSTOLIC BLOOD PRESSURE: 135 MMHG | DIASTOLIC BLOOD PRESSURE: 59 MMHG | DIASTOLIC BLOOD PRESSURE: 59 MMHG

## 2018-07-13 LAB — ISTAT ACT: 188 SEC (ref 92–181)

## 2018-07-13 RX ADMIN — GABAPENTIN 1 MG: 300 CAPSULE ORAL at 07:56

## 2018-07-13 RX ADMIN — LISINOPRIL 1 MG: 10 TABLET ORAL at 10:29

## 2018-07-13 RX ADMIN — ASPIRIN 1 MG: 325 TABLET, DELAYED RELEASE ORAL at 07:56

## 2018-07-13 RX ADMIN — CALCIUM CARBONATE-VITAMIN D TAB 500 MG-200 UNIT 1 TAB: 500-200 TAB at 07:56

## 2018-07-13 RX ADMIN — OXYCODONE HYDROCHLORIDE AND ACETAMINOPHEN 1 TAB: 5; 325 TABLET ORAL at 07:57

## 2018-07-13 RX ADMIN — MULTIPLE VITAMINS W/ MINERALS TAB 1 TAB: TAB at 07:56

## 2018-08-28 ENCOUNTER — HOSPITAL ENCOUNTER (OUTPATIENT)
Dept: HOSPITAL 61 - PMGWOUND | Age: 73
Discharge: HOME | End: 2018-08-28
Attending: EMERGENCY MEDICINE
Payer: COMMERCIAL

## 2018-08-28 DIAGNOSIS — N18.3: ICD-10-CM

## 2018-08-28 DIAGNOSIS — T81.31XD: Primary | ICD-10-CM

## 2018-08-28 DIAGNOSIS — I73.9: ICD-10-CM

## 2018-08-28 DIAGNOSIS — Z87.891: ICD-10-CM

## 2018-08-28 DIAGNOSIS — Z96.659: ICD-10-CM

## 2018-08-28 DIAGNOSIS — E78.00: ICD-10-CM

## 2018-08-28 DIAGNOSIS — M81.0: ICD-10-CM
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
## 2018-08-28 DIAGNOSIS — F32.9: ICD-10-CM

## 2018-08-28 DIAGNOSIS — I12.9: ICD-10-CM

## 2018-08-28 DIAGNOSIS — E78.5: ICD-10-CM

## 2018-08-28 DIAGNOSIS — F10.19: ICD-10-CM

## 2018-08-28 PROCEDURE — 11042 DBRDMT SUBQ TIS 1ST 20SQCM/<: CPT

## 2018-08-28 PROCEDURE — 97605 NEG PRS WND THER DME<=50SQCM: CPT

## 2018-09-10 ENCOUNTER — HOSPITAL ENCOUNTER (OUTPATIENT)
Dept: HOSPITAL 61 - PMGWOUND | Age: 73
Discharge: HOME | End: 2018-09-10
Attending: EMERGENCY MEDICINE
Payer: COMMERCIAL

## 2018-09-10 DIAGNOSIS — F32.9: ICD-10-CM
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
## 2018-09-10 DIAGNOSIS — T81.31XD: Primary | ICD-10-CM

## 2018-09-10 DIAGNOSIS — E78.5: ICD-10-CM

## 2018-09-10 DIAGNOSIS — N18.3: ICD-10-CM

## 2018-09-10 DIAGNOSIS — Z87.891: ICD-10-CM

## 2018-09-10 DIAGNOSIS — E78.00: ICD-10-CM

## 2018-09-10 DIAGNOSIS — M81.0: ICD-10-CM

## 2018-09-10 DIAGNOSIS — T81.89XD: ICD-10-CM

## 2018-09-10 DIAGNOSIS — I12.9: ICD-10-CM

## 2018-09-10 DIAGNOSIS — I73.9: ICD-10-CM

## 2018-09-10 PROCEDURE — 97597 DBRDMT OPN WND 1ST 20 CM/<: CPT

## 2018-09-14 ENCOUNTER — HOSPITAL ENCOUNTER (OUTPATIENT)
Dept: HOSPITAL 61 - PMGWOUND | Age: 73
Discharge: HOME | End: 2018-09-14
Attending: NURSE PRACTITIONER
Payer: COMMERCIAL

## 2018-09-14 DIAGNOSIS — I73.9: ICD-10-CM

## 2018-09-14 DIAGNOSIS — E78.5: ICD-10-CM

## 2018-09-14 DIAGNOSIS — N18.3: ICD-10-CM

## 2018-09-14 DIAGNOSIS — I12.9: ICD-10-CM

## 2018-09-14 DIAGNOSIS — M81.0: ICD-10-CM

## 2018-09-14 DIAGNOSIS — T81.31XD: Primary | ICD-10-CM
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
## 2018-09-14 DIAGNOSIS — E78.00: ICD-10-CM

## 2018-09-14 DIAGNOSIS — Z87.891: ICD-10-CM

## 2018-09-14 DIAGNOSIS — F32.9: ICD-10-CM

## 2018-09-14 PROCEDURE — 99214 OFFICE O/P EST MOD 30 MIN: CPT

## 2018-09-14 PROCEDURE — G0463 HOSPITAL OUTPT CLINIC VISIT: HCPCS

## 2018-09-18 ENCOUNTER — HOSPITAL ENCOUNTER (OUTPATIENT)
Dept: HOSPITAL 61 - PMGWOUND | Age: 73
Discharge: HOME | End: 2018-09-18
Attending: EMERGENCY MEDICINE
Payer: COMMERCIAL

## 2018-09-18 DIAGNOSIS — E78.5: ICD-10-CM

## 2018-09-18 DIAGNOSIS — I12.9: ICD-10-CM

## 2018-09-18 DIAGNOSIS — N18.3: ICD-10-CM

## 2018-09-18 DIAGNOSIS — Z87.891: ICD-10-CM

## 2018-09-18 DIAGNOSIS — T81.31XD: Primary | ICD-10-CM

## 2018-09-18 DIAGNOSIS — E78.00: ICD-10-CM

## 2018-09-18 DIAGNOSIS — I73.9: ICD-10-CM

## 2018-09-18 DIAGNOSIS — M81.0: ICD-10-CM
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
## 2018-09-18 DIAGNOSIS — F32.9: ICD-10-CM

## 2018-09-18 PROCEDURE — 97597 DBRDMT OPN WND 1ST 20 CM/<: CPT

## 2018-09-25 ENCOUNTER — HOSPITAL ENCOUNTER (OUTPATIENT)
Dept: HOSPITAL 61 - PMGWOUND | Age: 73
Discharge: HOME | End: 2018-09-25
Attending: EMERGENCY MEDICINE
Payer: COMMERCIAL

## 2018-09-25 DIAGNOSIS — I73.9: ICD-10-CM
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
## 2018-09-25 DIAGNOSIS — T81.31XD: Primary | ICD-10-CM

## 2018-09-25 DIAGNOSIS — I12.9: ICD-10-CM

## 2018-09-25 DIAGNOSIS — Z87.891: ICD-10-CM

## 2018-09-25 DIAGNOSIS — F32.9: ICD-10-CM

## 2018-09-25 DIAGNOSIS — N18.3: ICD-10-CM

## 2018-09-25 DIAGNOSIS — E78.00: ICD-10-CM

## 2018-09-25 DIAGNOSIS — E78.5: ICD-10-CM

## 2018-09-25 DIAGNOSIS — M81.0: ICD-10-CM

## 2018-09-25 PROCEDURE — 99214 OFFICE O/P EST MOD 30 MIN: CPT

## 2018-09-25 PROCEDURE — G0463 HOSPITAL OUTPT CLINIC VISIT: HCPCS

## 2018-10-02 ENCOUNTER — HOSPITAL ENCOUNTER (OUTPATIENT)
Dept: HOSPITAL 61 - PMGWOUND | Age: 73
Discharge: HOME | End: 2018-10-02
Attending: EMERGENCY MEDICINE
Payer: COMMERCIAL

## 2018-10-02 DIAGNOSIS — N18.3: ICD-10-CM

## 2018-10-02 DIAGNOSIS — M81.0: ICD-10-CM

## 2018-10-02 DIAGNOSIS — F32.9: ICD-10-CM

## 2018-10-02 DIAGNOSIS — I73.9: ICD-10-CM

## 2018-10-02 DIAGNOSIS — E78.00: ICD-10-CM

## 2018-10-02 DIAGNOSIS — T81.31XD: Primary | ICD-10-CM

## 2018-10-02 DIAGNOSIS — Z87.891: ICD-10-CM

## 2018-10-02 DIAGNOSIS — E78.5: ICD-10-CM

## 2018-10-02 DIAGNOSIS — I12.9: ICD-10-CM

## 2018-10-02 PROCEDURE — 97597 DBRDMT OPN WND 1ST 20 CM/<: CPT

## 2018-10-09 ENCOUNTER — HOSPITAL ENCOUNTER (OUTPATIENT)
Dept: HOSPITAL 61 - PMGWOUND | Age: 73
Discharge: HOME | End: 2018-10-09
Attending: EMERGENCY MEDICINE
Payer: COMMERCIAL

## 2018-10-09 DIAGNOSIS — Z87.891: ICD-10-CM

## 2018-10-09 DIAGNOSIS — Z96.659: ICD-10-CM

## 2018-10-09 DIAGNOSIS — E78.5: ICD-10-CM

## 2018-10-09 DIAGNOSIS — I73.9: ICD-10-CM

## 2018-10-09 DIAGNOSIS — T81.31XD: Primary | ICD-10-CM

## 2018-10-09 DIAGNOSIS — M81.0: ICD-10-CM
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
## 2018-10-09 DIAGNOSIS — F32.9: ICD-10-CM

## 2018-10-09 DIAGNOSIS — I12.9: ICD-10-CM

## 2018-10-09 DIAGNOSIS — N18.3: ICD-10-CM

## 2018-10-09 PROCEDURE — 99213 OFFICE O/P EST LOW 20 MIN: CPT

## 2019-10-02 ENCOUNTER — HOSPITAL ENCOUNTER (INPATIENT)
Dept: HOSPITAL 61 - ER | Age: 74
LOS: 3 days | Discharge: HOME | DRG: 73 | End: 2019-10-05
Attending: FAMILY MEDICINE | Admitting: FAMILY MEDICINE
Payer: COMMERCIAL

## 2019-10-02 VITALS — SYSTOLIC BLOOD PRESSURE: 149 MMHG | DIASTOLIC BLOOD PRESSURE: 48 MMHG

## 2019-10-02 VITALS — BODY MASS INDEX: 23.93 KG/M2 | HEIGHT: 62 IN | WEIGHT: 130.04 LBS

## 2019-10-02 DIAGNOSIS — E46: ICD-10-CM

## 2019-10-02 DIAGNOSIS — K80.20: ICD-10-CM

## 2019-10-02 DIAGNOSIS — D72.829: ICD-10-CM

## 2019-10-02 DIAGNOSIS — J44.1: ICD-10-CM

## 2019-10-02 DIAGNOSIS — R74.0: ICD-10-CM

## 2019-10-02 DIAGNOSIS — I73.9: ICD-10-CM

## 2019-10-02 DIAGNOSIS — J96.20: ICD-10-CM

## 2019-10-02 DIAGNOSIS — F17.200: ICD-10-CM

## 2019-10-02 DIAGNOSIS — G50.0: Primary | ICD-10-CM

## 2019-10-02 DIAGNOSIS — I10: ICD-10-CM

## 2019-10-02 LAB
ALBUMIN SERPL-MCNC: 3.8 G/DL (ref 3.4–5)
ALBUMIN/GLOB SERPL: 1.1 {RATIO} (ref 1–1.7)
ALP SERPL-CCNC: 116 U/L (ref 46–116)
ALT SERPL-CCNC: 62 U/L (ref 14–59)
ANION GAP SERPL CALC-SCNC: 8 MMOL/L (ref 6–14)
AST SERPL-CCNC: 63 U/L (ref 15–37)
BASOPHILS # BLD AUTO: 0.1 X10^3/UL (ref 0–0.2)
BASOPHILS NFR BLD: 0 % (ref 0–3)
BILIRUB SERPL-MCNC: 0.3 MG/DL (ref 0.2–1)
BUN SERPL-MCNC: 16 MG/DL (ref 7–20)
BUN/CREAT SERPL: 16 (ref 6–20)
CALCIUM SERPL-MCNC: 10.1 MG/DL (ref 8.5–10.1)
CHLORIDE SERPL-SCNC: 94 MMOL/L (ref 98–107)
CO2 SERPL-SCNC: 29 MMOL/L (ref 21–32)
CREAT SERPL-MCNC: 1 MG/DL (ref 0.6–1)
EOSINOPHIL NFR BLD: 0.2 X10^3/UL (ref 0–0.7)
EOSINOPHIL NFR BLD: 1 % (ref 0–3)
ERYTHROCYTE [DISTWIDTH] IN BLOOD BY AUTOMATED COUNT: 14.5 % (ref 11.5–14.5)
GFR SERPLBLD BASED ON 1.73 SQ M-ARVRAT: 53.9 ML/MIN
GLOBULIN SER-MCNC: 3.5 G/DL (ref 2.2–3.8)
GLUCOSE SERPL-MCNC: 105 MG/DL (ref 70–99)
HCT VFR BLD CALC: 37.6 % (ref 36–47)
HGB BLD-MCNC: 12.8 G/DL (ref 12–15.5)
LYMPHOCYTES # BLD: 1.8 X10^3/UL (ref 1–4.8)
LYMPHOCYTES NFR BLD AUTO: 12 % (ref 24–48)
MCH RBC QN AUTO: 31 PG (ref 25–35)
MCHC RBC AUTO-ENTMCNC: 34 G/DL (ref 31–37)
MCV RBC AUTO: 90 FL (ref 79–100)
MONO #: 0.8 X10^3/UL (ref 0–1.1)
MONOCYTES NFR BLD: 5 % (ref 0–9)
NEUT #: 12 X10^3/UL (ref 1.8–7.7)
NEUTROPHILS NFR BLD AUTO: 81 % (ref 31–73)
PLATELET # BLD AUTO: 248 X10^3/UL (ref 140–400)
POTASSIUM SERPL-SCNC: 4.4 MMOL/L (ref 3.5–5.1)
PROT SERPL-MCNC: 7.3 G/DL (ref 6.4–8.2)
RBC # BLD AUTO: 4.18 X10^6/UL (ref 3.5–5.4)
SODIUM SERPL-SCNC: 131 MMOL/L (ref 136–145)
WBC # BLD AUTO: 14.8 X10^3/UL (ref 4–11)

## 2019-10-02 PROCEDURE — 94640 AIRWAY INHALATION TREATMENT: CPT

## 2019-10-02 PROCEDURE — 36415 COLL VENOUS BLD VENIPUNCTURE: CPT

## 2019-10-02 PROCEDURE — 71045 X-RAY EXAM CHEST 1 VIEW: CPT

## 2019-10-02 PROCEDURE — 85025 COMPLETE CBC W/AUTO DIFF WBC: CPT

## 2019-10-02 PROCEDURE — 84484 ASSAY OF TROPONIN QUANT: CPT

## 2019-10-02 PROCEDURE — 80053 COMPREHEN METABOLIC PANEL: CPT

## 2019-10-02 PROCEDURE — 85651 RBC SED RATE NONAUTOMATED: CPT

## 2019-10-02 PROCEDURE — 90471 IMMUNIZATION ADMIN: CPT

## 2019-10-02 PROCEDURE — 83880 ASSAY OF NATRIURETIC PEPTIDE: CPT

## 2019-10-02 PROCEDURE — 90686 IIV4 VACC NO PRSV 0.5 ML IM: CPT

## 2019-10-02 PROCEDURE — 85007 BL SMEAR W/DIFF WBC COUNT: CPT

## 2019-10-02 PROCEDURE — G0378 HOSPITAL OBSERVATION PER HR: HCPCS

## 2019-10-02 PROCEDURE — 70553 MRI BRAIN STEM W/O & W/DYE: CPT

## 2019-10-02 PROCEDURE — 82550 ASSAY OF CK (CPK): CPT

## 2019-10-02 PROCEDURE — 71275 CT ANGIOGRAPHY CHEST: CPT

## 2019-10-02 PROCEDURE — 93005 ELECTROCARDIOGRAM TRACING: CPT

## 2019-10-02 PROCEDURE — 85379 FIBRIN DEGRADATION QUANT: CPT

## 2019-10-02 NOTE — PHYS DOC
Past Medical History


Past Medical History:  Hypertension


Past Surgical History:  Other


Additional Past Surgical Histo:  RIGHT LEG VASCULAR SX


Alcohol Use:  None


Drug Use:  None





Adult General


Chief Complaint


Chief Complaint:  SHORTNESS OF BREATH





HPI


HPI





74-year-old female presents to the emergency department with complaints of 

shortness of breath. Patient describes worsening shortness of breath over the 

last 3-4 days that she's been doing with a "cold" for weeks. She describes cough

productive with yellow sputum. She describes intermittent chills. No nausea, 

vomiting, diabetes, abdominal pain. Room air saturations 88%, with conversation 

down to 86%.  Patient denies any home oxygen.  Shortness of breath is at rest as

well as with exertion.





Review of Systems


Review of Systems





Constitutional: Intermittent chills


Eyes: Denies change in visual acuity, redness, or eye pain []


HENT: sore throat []


Respiratory: Productive cough, shortness of breath


Cardiovascular: No additional information not addressed in HPI []


GI: Denies abdominal pain, nausea, vomiting, bloody stools or diarrhea []


: Denies dysuria or hematuria []


Musculoskeletal: Denies back pain or joint pain []


Neurologic: Denies headache, focal weakness or sensory changes []





All other systems were reviewed and found to be within normal limits, except as 

documented in this note.





Current Medications


Current Medications





Current Medications








 Medications


  (Trade)  Dose


 Ordered  Sig/Gordon  Start Time


 Stop Time Status Last Admin


Dose Admin


 


 Albuterol/


 Ipratropium


  (Duoneb)  3 ml  1X  ONCE  10/2/19 19:45


 10/2/19 19:46 DC 10/2/19 19:53


3 ML


 


 Info


  (CONTRAST GIVEN


 -- Rx MONITORING)  1 each  PRN DAILY  PRN  10/2/19 21:00


 10/4/19 20:59   





 


 Iohexol


  (Omnipaque 350


 Mg/ml)  60 ml  1X  ONCE  10/2/19 21:00


 10/2/19 21:01 DC  





 


 Levofloxacin/


 Dextrose  150 ml @ 


 100 mls/hr  1X  ONCE  10/2/19 22:00


 10/2/19 23:29 UNV  





 


 Methylprednisolone


 Sodium Succinate


  (SOLU-Medrol


 125MG VIAL)  125 mg  1X  ONCE  10/2/19 20:30


 10/2/19 20:31 DC 10/2/19 20:48


125 MG











Allergies


Allergies





Allergies








Coded Allergies Type Severity Reaction Last Updated Verified


 


  No Known Drug Allergies    7/10/18 No











Physical Exam


Physical Exam





Constitutional: Well developed, well nourished, in mild distress secondary to 

shortness of breath, non-toxic appearance. []


HENT: Normocephalic, atraumatic, bilateral external ears normal, oropharynx 

moist, no oral exudates, nose normal. []


Eyes: PERRLA, EOMI, conjunctiva normal, no discharge. [] 


Cardiovascular:Heart rate regular rhythm, no murmur []


Lungs & Thorax: Coarse breath sounds throughout, occasional wheeze.


Abdomen: Bowel sounds normal, soft, no tenderness, no masses, no pulsatile 

masses. [] 


Skin: Warm, dry, no erythema, no rash. [] 


Back: No tenderness, no CVA tenderness. [] 


Extremities: No tenderness, no edema. [] 


Neurologic: Alert and oriented X 3, no focal deficits noted. []


Psychologic: Affect normal, judgement normal, mood normal. []





Current Patient Data


Vital Signs





                                   Vital Signs








  Date Time  Temp Pulse Resp B/P (MAP) Pulse Ox O2 Delivery O2 Flow Rate FiO2


 


10/2/19 20:57  87 16 145/62 (89) 93 Nasal Cannula 2.0 


 


10/2/19 19:15 97.9       





 97.9       








Lab Values





                                Laboratory Tests








Test


 10/2/19


19:35 10/2/19


19:37


 


White Blood Count


 14.8 x10^3/uL


(4.0-11.0)  H 





 


Red Blood Count


 4.18 x10^6/uL


(3.50-5.40) 





 


Hemoglobin


 12.8 g/dL


(12.0-15.5) 





 


Hematocrit


 37.6 %


(36.0-47.0) 





 


Mean Corpuscular Volume


 90 fL ()


 





 


Mean Corpuscular Hemoglobin 31 pg (25-35)   


 


Mean Corpuscular Hemoglobin


Concent 34 g/dL


(31-37) 





 


Red Cell Distribution Width


 14.5 %


(11.5-14.5) 





 


Platelet Count


 248 x10^3/uL


(140-400) 





 


Neutrophils (%) (Auto) 81 % (31-73)  H 


 


Lymphocytes (%) (Auto) 12 % (24-48)  L 


 


Monocytes (%) (Auto) 5 % (0-9)   


 


Eosinophils (%) (Auto) 1 % (0-3)   


 


Basophils (%) (Auto) 0 % (0-3)   


 


Neutrophils # (Auto)


 12.0 x10^3/uL


(1.8-7.7)  H 





 


Lymphocytes # (Auto)


 1.8 x10^3/uL


(1.0-4.8) 





 


Monocytes # (Auto)


 0.8 x10^3/uL


(0.0-1.1) 





 


Eosinophils # (Auto)


 0.2 x10^3/uL


(0.0-0.7) 





 


Basophils # (Auto)


 0.1 x10^3/uL


(0.0-0.2) 





 


Sodium Level


 131 mmol/L


(136-145)  L 





 


Potassium Level


 4.4 mmol/L


(3.5-5.1) 





 


Chloride Level


 94 mmol/L


()  L 





 


Carbon Dioxide Level


 29 mmol/L


(21-32) 





 


Anion Gap 8 (6-14)   


 


Blood Urea Nitrogen


 16 mg/dL


(7-20) 





 


Creatinine


 1.0 mg/dL


(0.6-1.0) 





 


Estimated GFR


(Cockcroft-Gault) 53.9  


 





 


BUN/Creatinine Ratio 16 (6-20)   


 


Glucose Level


 105 mg/dL


(70-99)  H 





 


Calcium Level


 10.1 mg/dL


(8.5-10.1) 





 


Total Bilirubin


 0.3 mg/dL


(0.2-1.0) 





 


Aspartate Amino Transferase


(AST) 63 U/L (15-37)


H 





 


Alanine Aminotransferase (ALT)


 62 U/L (14-59)


H 





 


Alkaline Phosphatase


 116 U/L


() 





 


Troponin I Quantitative


 < 0.017 ng/mL


(0.000-0.055) 





 


NT-Pro-B-Type Natriuretic


Peptide 445 pg/mL


(0-449) 





 


Total Protein


 7.3 g/dL


(6.4-8.2) 





 


Albumin


 3.8 g/dL


(3.4-5.0) 





 


Albumin/Globulin Ratio 1.1 (1.0-1.7)   


 


D-Dimer (Anneliese)


 


 1.16 ug/mlFEU


(0.00-0.50)  H





                                Laboratory Tests


10/2/19 19:35








                                Laboratory Tests


10/2/19 19:35














EKG


EKG


EKG reviewed, normal sinus rhythm. Nonurgent EKG no evidence of ST elevation 

MI.[]


Interpretation Time:


Interpretation time 





Radiology/Procedures


Radiology/Procedures


[facility]


                    [address] [place] [address]


                                 [phone]


                                        


                                 IMAGING REPORT





                                     Signed





PATIENT: ROSS NGUYEN     ACCOUNT: [id number]     MRN#: [id number]


: 1945           LOCATION: ER              AGE: 74


SEX: F                    EXAM DT: 10/02/19         ACCESSION#: [id number]


STATUS: REG ER            ORD. PHYSICIAN: GERALDINE HUSSEIN MD


REASON: dyspnea, elevated ddimer


PROCEDURE: CT ANGIOGRAPHY CHEST





Exam: CT of chest with contrast


 


INDICATION: Dyspnea


 


TECHNIQUE: Sequential axial images through the chest obtained following 


the administration of 60 mL of Omni 350 IV contrast. Sagittal and coronal 


reformatted images were reconstructed from the axial data and reviewed. 


3-D reformatted images were reconstructed from the axial data and 


reviewed.


 


Comparisons: Chest radiograph same day


 


FINDINGS:


Visualized portions of the thyroid are unremarkable. No enlarged 


mediastinal lymph nodes.


 


Heart size is normal. No pericardial effusion. Thoracic aorta has a normal


course and caliber. Pulmonary artery is not enlarged. No pulmonary embolus


is identified within the main, lobar or segmental pulmonary arteries.


 


Mucous plugging noted within the lower lobes bilaterally with associated 


bronchial wall thickening. No consolidation. No pneumothorax. There is 


mild centrilobular emphysematous change noted predominantly within the 


upper lungs. There are scattered 2 to 3 mm pulmonary nodules diffusely 


throughout the lungs. Largest nodule is in the right upper lobe measuring 


4 mm series 3 image 76.


 


No pleural effusion or thickening.


 


Gallstones noted within the gallbladder. Otherwise, visualized upper 


abdomen is unremarkable. 


 


No suspicious osseous lesions or acute fractures.


 


IMPRESSION:


1.  No pulmonary embolus identified within the main, lobar or segmental 


pulmonary arteries.


2.  Bronchial wall thickening and mucous plugging in the lower lobes, can 


be seen in setting of bronchitis. No focal consolidation.


3.  Scattered pulmonary nodules throughout the lungs largest measuring up 


to 4 mm. In this likely high-risk patient and optional one-year CT is 


recommended by Fleischner Society guidelines.


4.  Cholelithiasis


 


 


Exposure: One or more of the following in the visualized dose reduction 


techniques were utilized for this examination:


1.  Automated exposure control


2.  Adjustment of the MA and/or KV according to patient size


3.  Use of iterative of reconstructive technique


 


Electronically signed by: Anne Shanks MD (10/2/2019 9:37 PM) [facility]














DICTATED and SIGNED BY:     ANNE SHANKS MD


DATE:     10/02/19 2137


[]





                            [facility]


                    [address] [place] [address]


                                 [phone]


                                        


                                 IMAGING REPORT





                                     Signed





PATIENT: ROSS NGUYEN     ACCOUNT: [id number]     MRN#: [id number]


: 1945           LOCATION: ER              AGE: 74


SEX: F                    EXAM DT: 10/02/19         ACCESSION#: [id number]


STATUS: REG ER            ORD. PHYSICIAN: NON,STAFF 


REASON: Short of breath


PROCEDURE: PORTABLE CHEST 1V





PORTABLE CHEST 1V


 


Clinical History:


 


Technique:  AP view of the chest was obtained at 10/2/2019 7:48 PM.


 


Comparison: None.


 


Findings:


 


The cardiomediastinal silhouette is normal. The pulmonary vasculature is 


normal. Reticular opacities of the lungs is likely chronic pulmonary 


fibrosis.


 


Impression:  


 


No evidence of an acute cardiopulmonary process.


 


Electronically signed by: Theresa Gilmore III, MD (10/2/2019 8:21 PM) [facility]














DICTATED and SIGNED BY:     THERESA GILMORE III, MD


DATE:     10/02/19 2021





Course & Med Decision Making


Course & Med Decision Making


Pertinent Labs and Imaging studies reviewed. (See chart for details)





[]74-year-old female presents to the emergency department with complaints of 

shortness of breath. Patient describes worsening shortness of breath over the 

last 3-4 days that she's been doing with a "cold" for weeks. She describes cough

 productive with yellow sputum. She describes intermittent chills. No nausea, 

vomiting, diabetes, abdominal pain. Room air saturations 88%, with conversation 

down to 86%.  Patient denies any home oxygen.  Shortness of breath is at rest as

 well as with exertion.





Labs/imaging reviewed.  Albuterol initiated upon arrival, saturations 88%. With 

conversation patient dropped to 86%. She currently is on 2 L with saturations 

9496%. Patient has no chronic oxygen. D-dimer was elevated 1.16 CT was performed

 with evidence of mucous plugging and bronchial wall thickening in the lower 

lobes bilaterally there is no evidence of acute consolidation appreciated. Given

 hypoxia, and need for oxygen therapy would recommend admission. Will discuss 

with patient's primary care physician.





Dragon Disclaimer


Dragon Disclaimer


This electronic medical record was generated, in whole or in part, using a voice

 recognition dictation system.





Departure


Departure


Impression:  


   Primary Impression:  


   COPD exacerbation


   Additional Impression:  


   Hypoxia


Disposition:  09 ADMITTED AS INPATIENT


Admitting Physician:  Flaquito Martinez


Condition:  STABLE


Referrals:  


FLAQUITO MARTINEZ MD (PCP)





Problem Qualifiers











GERALDINE HUSSEIN MD               Oct 2, 2019 20:52

## 2019-10-02 NOTE — RAD
Exam: CT of chest with contrast

 

INDICATION: Dyspnea

 

TECHNIQUE: Sequential axial images through the chest obtained following 

the administration of 60 mL of Omni 350 IV contrast. Sagittal and coronal 

reformatted images were reconstructed from the axial data and reviewed. 

3-D reformatted images were reconstructed from the axial data and 

reviewed.

 

Comparisons: Chest radiograph same day

 

FINDINGS:

Visualized portions of the thyroid are unremarkable. No enlarged 

mediastinal lymph nodes.

 

Heart size is normal. No pericardial effusion. Thoracic aorta has a normal

course and caliber. Pulmonary artery is not enlarged. No pulmonary embolus

is identified within the main, lobar or segmental pulmonary arteries.

 

Mucous plugging noted within the lower lobes bilaterally with associated 

bronchial wall thickening. No consolidation. No pneumothorax. There is 

mild centrilobular emphysematous change noted predominantly within the 

upper lungs. There are scattered 2 to 3 mm pulmonary nodules diffusely 

throughout the lungs. Largest nodule is in the right upper lobe measuring 

4 mm series 3 image 76.

 

No pleural effusion or thickening.

 

Gallstones noted within the gallbladder. Otherwise, visualized upper 

abdomen is unremarkable. 

 

No suspicious osseous lesions or acute fractures.

 

IMPRESSION:

1.  No pulmonary embolus identified within the main, lobar or segmental 

pulmonary arteries.

2.  Bronchial wall thickening and mucous plugging in the lower lobes, can 

be seen in setting of bronchitis. No focal consolidation.

3.  Scattered pulmonary nodules throughout the lungs largest measuring up 

to 4 mm. In this likely high-risk patient and optional one-year CT is 

recommended by Fleischner Society guidelines.

4.  Cholelithiasis

 

 

Exposure: One or more of the following in the visualized dose reduction 

techniques were utilized for this examination:

1.  Automated exposure control

2.  Adjustment of the MA and/or KV according to patient size

3.  Use of iterative of reconstructive technique

 

Electronically signed by: Anne Anderson MD (10/2/2019 9:37 PM) [facility]

## 2019-10-02 NOTE — RAD
PORTABLE CHEST 1V

 

Clinical History:

 

Technique:  AP view of the chest was obtained at 10/2/2019 7:48 PM.

 

Comparison: None.

 

Findings:

 

The cardiomediastinal silhouette is normal. The pulmonary vasculature is 

normal. Reticular opacities of the lungs is likely chronic pulmonary 

fibrosis.

 

Impression:  

 

No evidence of an acute cardiopulmonary process.

 

Electronically signed by: Chad Salazar III, MD (10/2/2019 8:21 PM) [facility]

## 2019-10-03 VITALS — DIASTOLIC BLOOD PRESSURE: 47 MMHG | SYSTOLIC BLOOD PRESSURE: 147 MMHG

## 2019-10-03 VITALS — SYSTOLIC BLOOD PRESSURE: 126 MMHG | DIASTOLIC BLOOD PRESSURE: 50 MMHG

## 2019-10-03 VITALS — SYSTOLIC BLOOD PRESSURE: 143 MMHG | DIASTOLIC BLOOD PRESSURE: 58 MMHG

## 2019-10-03 VITALS — DIASTOLIC BLOOD PRESSURE: 51 MMHG | SYSTOLIC BLOOD PRESSURE: 134 MMHG

## 2019-10-03 VITALS — SYSTOLIC BLOOD PRESSURE: 158 MMHG | DIASTOLIC BLOOD PRESSURE: 59 MMHG

## 2019-10-03 VITALS — SYSTOLIC BLOOD PRESSURE: 134 MMHG | DIASTOLIC BLOOD PRESSURE: 60 MMHG

## 2019-10-03 LAB
% BANDS: 1 % (ref 0–9)
% LYMPHS: 7 % (ref 24–48)
% MONOS: 2 % (ref 0–10)
% SEGS: 90 % (ref 35–66)
ALBUMIN SERPL-MCNC: 3.2 G/DL (ref 3.4–5)
ALBUMIN/GLOB SERPL: 0.8 {RATIO} (ref 1–1.7)
ALP SERPL-CCNC: 101 U/L (ref 46–116)
ALT SERPL-CCNC: 51 U/L (ref 14–59)
ANION GAP SERPL CALC-SCNC: 9 MMOL/L (ref 6–14)
AST SERPL-CCNC: 41 U/L (ref 15–37)
BASOPHILS # BLD AUTO: 0 X10^3/UL (ref 0–0.2)
BASOPHILS NFR BLD: 0 % (ref 0–3)
BILIRUB SERPL-MCNC: 0.3 MG/DL (ref 0.2–1)
BUN SERPL-MCNC: 14 MG/DL (ref 7–20)
BUN/CREAT SERPL: 13 (ref 6–20)
CALCIUM SERPL-MCNC: 9.7 MG/DL (ref 8.5–10.1)
CHLORIDE SERPL-SCNC: 94 MMOL/L (ref 98–107)
CO2 SERPL-SCNC: 29 MMOL/L (ref 21–32)
CREAT SERPL-MCNC: 1.1 MG/DL (ref 0.6–1)
EOSINOPHIL NFR BLD: 0 % (ref 0–3)
EOSINOPHIL NFR BLD: 0 X10^3/UL (ref 0–0.7)
ERYTHROCYTE [DISTWIDTH] IN BLOOD BY AUTOMATED COUNT: 14.2 % (ref 11.5–14.5)
GFR SERPLBLD BASED ON 1.73 SQ M-ARVRAT: 48.3 ML/MIN
GLOBULIN SER-MCNC: 3.9 G/DL (ref 2.2–3.8)
GLUCOSE SERPL-MCNC: 145 MG/DL (ref 70–99)
HCT VFR BLD CALC: 36.8 % (ref 36–47)
HGB BLD-MCNC: 12.4 G/DL (ref 12–15.5)
LYMPHOCYTES # BLD: 1.2 X10^3/UL (ref 1–4.8)
LYMPHOCYTES NFR BLD AUTO: 7 % (ref 24–48)
MCH RBC QN AUTO: 31 PG (ref 25–35)
MCHC RBC AUTO-ENTMCNC: 34 G/DL (ref 31–37)
MCV RBC AUTO: 90 FL (ref 79–100)
MONO #: 0.2 X10^3/UL (ref 0–1.1)
MONOCYTES NFR BLD: 2 % (ref 0–9)
NEUT #: 14.7 X10^3/UL (ref 1.8–7.7)
NEUTROPHILS NFR BLD AUTO: 91 % (ref 31–73)
PLATELET # BLD AUTO: 246 X10^3/UL (ref 140–400)
PLATELET # BLD EST: ADEQUATE 10*3/UL
POTASSIUM SERPL-SCNC: 4.2 MMOL/L (ref 3.5–5.1)
PROT SERPL-MCNC: 7.1 G/DL (ref 6.4–8.2)
RBC # BLD AUTO: 4.07 X10^6/UL (ref 3.5–5.4)
SODIUM SERPL-SCNC: 132 MMOL/L (ref 136–145)
TOXIC GRANULES BLD QL SMEAR: SLIGHT
WBC # BLD AUTO: 16.2 X10^3/UL (ref 4–11)

## 2019-10-03 RX ADMIN — METHYLPREDNISOLONE SODIUM SUCCINATE SCH MG: 40 INJECTION, POWDER, FOR SOLUTION INTRAMUSCULAR; INTRAVENOUS at 09:01

## 2019-10-03 RX ADMIN — ASPIRIN SCH MG: 81 TABLET, COATED ORAL at 09:02

## 2019-10-03 RX ADMIN — PREGABALIN SCH MG: 75 CAPSULE ORAL at 21:23

## 2019-10-03 RX ADMIN — FAMOTIDINE SCH MG: 20 TABLET ORAL at 09:02

## 2019-10-03 RX ADMIN — FAMOTIDINE SCH MG: 20 TABLET ORAL at 21:23

## 2019-10-03 RX ADMIN — IPRATROPIUM BROMIDE AND ALBUTEROL SULFATE SCH ML: .5; 3 SOLUTION RESPIRATORY (INHALATION) at 15:25

## 2019-10-03 RX ADMIN — IPRATROPIUM BROMIDE AND ALBUTEROL SULFATE SCH ML: .5; 3 SOLUTION RESPIRATORY (INHALATION) at 19:55

## 2019-10-03 RX ADMIN — IPRATROPIUM BROMIDE AND ALBUTEROL SULFATE SCH ML: .5; 3 SOLUTION RESPIRATORY (INHALATION) at 11:35

## 2019-10-03 RX ADMIN — PREGABALIN SCH MG: 75 CAPSULE ORAL at 09:02

## 2019-10-03 RX ADMIN — METHYLPREDNISOLONE SODIUM SUCCINATE SCH MG: 40 INJECTION, POWDER, FOR SOLUTION INTRAMUSCULAR; INTRAVENOUS at 21:24

## 2019-10-03 NOTE — PDOC
Provider Note


Provider Note


[id number]











FLAQUITO MARTINEZ MD              Oct 3, 2019 08:10

## 2019-10-03 NOTE — RAD
MRI of the Brain without and with Contrast 10/3/2019

 

Clinical History: Left trigeminal neuralgia.

 

Technique: Unenhanced T1-weighted sagittal and axial and FLAIR, 

T2-weighted, gradient echo and diffusion-weighted axial images of the 

brain were obtained. Enhanced thin section T2-weighted axial and 

T1-weighted axial and coronal images through the brainstem to include the 

proximal trigeminal nerves were obtained. After the intravenous 

administration of 12 cc of DOTAREM, enhanced T1-weighted axial images of 

the brain were obtained. Additionally enhanced thin section T1-weighted 

coronal and axial images through the proximal trigeminal nerves were 

obtained.

 

Findings: No previous studies are available for comparison.

 

Some of the images from the study are degraded by patient motion.

 

There is generalized parenchymal atrophy. Patchy and several small focal 

areas of abnormally increased signal intensity are seen within the 

periventricular and subcortical white matter of both cerebral hemispheres 

on the FLAIR and T2-weighted images consistent with areas of very mild 

small vessel ischemic disease. No acute parenchymal abnormality is seen. 

No abnormal area of contrast enhancement is noted. No extra-axial fluid 

collection is seen. There is no MRI evidence of acute ischemia/infarction.

 

The visualized trigeminal nerves are within normal limits. No abnormal 

soft tissue mass or area of abnormal contrast enhancement is noted. The 

IACs are within normal limits.

 

Mild mucosal thickening is seen scattered throughout the paranasal 

sinuses. There are minimal bilateral mastoid effusions. Normal flow voids 

are seen within the major vascular structures surrounding the brain 

parenchyma.

 

Impression:   No acute parenchymal abnormality is seen.

 

Electronically signed by: Lior Mcbride MD (10/3/2019 4:13 PM) [facility]

## 2019-10-03 NOTE — NUR
This RN gave report to BOOM Garsia on 5S. Pt transported from 652 to 562 via wheelchair by 
this RN at approx 1320. Pt in bed with call light in reach. BOOM Garsia notified.

## 2019-10-03 NOTE — EKG
[facility] acute hospital

              [address] [place] [address]

Test Date:    2019-10-02               Test Time:    19:36:51

Pat Name:     ROSS NGUYEN               Department:   

Patient ID:   [id number]           Room:         652 1

Gender:       F                        Technician:   

:          1945               Requested By: STAFF NON

Order Number: [id number]           Reading MD:   Christopher Rodgers

                                 Measurements

Intervals                              Axis          

Rate:         88                       P:            54

DC:           124                      QRS:          8

QRSD:         126                      T:            118

QT:           368                                    

QTc:          448                                    

                           Interpretive Statements

SINUS RHYTHM

INCOMPLETE LBBB

LVH

NONSPECIFIC ST-T WAVE CHANGES.



Electronically Signed On 10- 16:29:43 CDT by Christopher Rodgers

## 2019-10-03 NOTE — HP
ADMIT DATE:  



CHIEF COMPLAINT:  Shortness of breath, cough and general wheezing.



HISTORY OF PRESENT ILLNESS:  A 74-year-old white female with known COPD, who is

not on oxygen or breathing treatments at home, came in with a several-day course

of increasing shortness of breath, cough and some sputum production.  She has

been battling left-sided facial pain for the last 2 months, felt to be

trigeminal neuralgia and has been seeing Dr. Doshi in our office.  She has been

taking Tegretol and apparently some Lyrica as well and is planning to see a

neurologist about the next 10 days.  She has not had an MRI that she is aware

of.  No hemoptysis, chest pain or other complaints.  CTA and chest x-ray in the

ER were clear and labs were okay.



PAST MEDICAL HISTORY:  Unremarkable.



MEDICATIONS:  Listed per the chart.



ALLERGIES:  No allergies.



SOCIAL HISTORY:  Still smokes.  .  Nondrinker.  She is not employed.



FAMILY HISTORY:  Unremarkable.



REVIEW OF SYSTEMS:  No other complaints.



OBJECTIVE:

ENT:  All within normal limits.

NECK:  No carotid bruits, nodes or thyroid enlargement.

LUNGS:  Crackles in both lung bases on inspiration and diffuse moderate

expiratory wheezes without tachypnea.

CARDIOVASCULAR:  Regular rate.  No tachycardia or murmur.

ABDOMEN:  Soft, benign and nontender.

EXTREMITIES:  She has decreased pedal pulses bilaterally.  She has 3+ clubbing

of the fingernails.  No joint or skin lesions or edema.

NEUROLOGIC:  She has increased sensitivity to light touch around the left

trigeminal nerve distribution, particularly above and below the eyes.  There are

no obvious motor signs.  Cranial nerves appear to be intact as do cerebellar

motor and other sensory function.  Gait was not tested.



ASSESSMENT:

1.  Acute exacerbation of chronic obstructive pulmonary disease.

2.  Facial pain, likely trigeminal neuralgia.

3.  History of hypertension.

4.  Protein-calorie malnutrition.



PLAN:  IV steroids, respiratory treatments, oral Levaquin.  Neurologic

consultation as she will likely need an MRI that will be declined if I order it.

 



______________________________

FLQAUITO MARTINEZ MD



DR:  LIYA/savanna  JOB#:  [id number] / [id number]

DD:  10/03/2019 08:09  DT:  10/03/2019 08:23

## 2019-10-04 VITALS — SYSTOLIC BLOOD PRESSURE: 147 MMHG | DIASTOLIC BLOOD PRESSURE: 51 MMHG

## 2019-10-04 VITALS — SYSTOLIC BLOOD PRESSURE: 120 MMHG | DIASTOLIC BLOOD PRESSURE: 40 MMHG

## 2019-10-04 VITALS — DIASTOLIC BLOOD PRESSURE: 53 MMHG | SYSTOLIC BLOOD PRESSURE: 136 MMHG

## 2019-10-04 VITALS — DIASTOLIC BLOOD PRESSURE: 57 MMHG | SYSTOLIC BLOOD PRESSURE: 125 MMHG

## 2019-10-04 VITALS — SYSTOLIC BLOOD PRESSURE: 134 MMHG | DIASTOLIC BLOOD PRESSURE: 56 MMHG

## 2019-10-04 VITALS — DIASTOLIC BLOOD PRESSURE: 55 MMHG | SYSTOLIC BLOOD PRESSURE: 132 MMHG

## 2019-10-04 RX ADMIN — Medication SCH CAP: at 21:07

## 2019-10-04 RX ADMIN — FAMOTIDINE SCH MG: 20 TABLET ORAL at 21:20

## 2019-10-04 RX ADMIN — METHYLPREDNISOLONE SODIUM SUCCINATE SCH MG: 40 INJECTION, POWDER, FOR SOLUTION INTRAMUSCULAR; INTRAVENOUS at 21:20

## 2019-10-04 RX ADMIN — IPRATROPIUM BROMIDE AND ALBUTEROL SULFATE SCH ML: .5; 3 SOLUTION RESPIRATORY (INHALATION) at 15:17

## 2019-10-04 RX ADMIN — METHYLPREDNISOLONE SODIUM SUCCINATE SCH MG: 40 INJECTION, POWDER, FOR SOLUTION INTRAMUSCULAR; INTRAVENOUS at 09:15

## 2019-10-04 RX ADMIN — IPRATROPIUM BROMIDE AND ALBUTEROL SULFATE SCH ML: .5; 3 SOLUTION RESPIRATORY (INHALATION) at 07:06

## 2019-10-04 RX ADMIN — PREGABALIN SCH MG: 75 CAPSULE ORAL at 21:20

## 2019-10-04 RX ADMIN — FAMOTIDINE SCH MG: 20 TABLET ORAL at 09:14

## 2019-10-04 RX ADMIN — PREGABALIN SCH MG: 75 CAPSULE ORAL at 09:14

## 2019-10-04 RX ADMIN — IPRATROPIUM BROMIDE AND ALBUTEROL SULFATE SCH ML: .5; 3 SOLUTION RESPIRATORY (INHALATION) at 11:03

## 2019-10-04 RX ADMIN — ASPIRIN SCH MG: 81 TABLET, COATED ORAL at 09:14

## 2019-10-04 RX ADMIN — Medication SCH CAP: at 12:04

## 2019-10-04 RX ADMIN — IPRATROPIUM BROMIDE AND ALBUTEROL SULFATE SCH ML: .5; 3 SOLUTION RESPIRATORY (INHALATION) at 19:59

## 2019-10-04 NOTE — NUR
SW following pt for dc planning. Chart reviewed. Pt lives at home with significant other. Pt 
has PMHx of COPD and does not use 02 at home. SW will continue to follow as needed.

## 2019-10-04 NOTE — PDOC
PROGRESS NOTES


Assessment


Assessment


Left side facial pain for 3-4 months.


Leukocytosis.


Trigeminal neuralgia.


SOB.


Cough.


COPD.


HTN.


Carotid A stenosis 50%.


Smoking.


No evidence of brain tumor.





RECOMMENDATIONS/PLAN:


Increased Tegretol to 400 mg bid.


Continue Lyrica.


Continue Zocor.


Smoking cessation.


Brain MRI w/wo contrast plus trigeminal protocol.





HISTORY OF THE PRESENT ILLNESS:


74-year-old female with above medical symptoms for admission. She has been 

having intermittent severe pain in her left side of face lasting for about 15 

seconds each time multiple time a day for about 2 years but became persistent 

for about 3-4 months this time. She was treated with Tegretol but stated not 

help. No hearing loss or ataxia. 


She stated no pain in her face since 3:00 pm today.





PAST MEDICAL HISTORY:


COPD.


HTN.





PAST SURGERY HISTORY: 


LE surgery due to PVD.





ALLERGY:


NKDA





MEDICATIONS:


Refer to MAR





FAMILY HISTORY: 


Non contributory.





SOCIAL HISTORY: 


Lives at home.


Denies drinking, and illicit drug use.  


She smoked 2 pack a day in the past then 1/2 pack of cigarettes a day recently, 

for 50 years.  





REVIEW OF SYSTEMS:


Constitutional: No malnutrition, weight loss, cachexia.


Head: No traumatic brain or head injury.


Skin: No edema, or rash.


Ear: No infection.


Eyes: No vision loss or color blindness.


Nose: No bleeding or purulent discharges.


Hearing: No hearing decrease.


Neck: No injury.


Breast: No history of cancer, masses,or discharges.


Cardiac: HTN, PVD.


Pulmonary: COPD.


GI: No GI ulcer, GI bleeding.


Urinary/genital: UTI.


Endocrinologic: No cousin face, craniofacial dysmorphism, polydactyly.


Skeletomuscular: No muscular atrophy, deformity.


Neurological: see  HP.


Psychiatric: Denies drug use/abuse.


Otherwise, not pertinant14-point review of systems.





PHYSICAL EXAMINATION:   


General appearance is in chronic distress.  


HEENT:  Normocephalic and nontraumatic.  Eyes, nose, ears, and throat are unrem

arkable.  


Neck is supple. No lymphadenopathy. No crepitus. 


Cardiovascular:  S1, S2, regular rate and rhythm.  


Pulmonary:  Clear to auscultation bilaterally. 


Abdomen:  Bowel sounds are positive.      


Extremities:  No rash, lesions, or edema.  


No restriction of range of motion





NEUROLOGICAL  EXAMINATION:


Alert 


Oriented to time, place and person.


PERRL.


EOMI.


CN: no focal findings.


Muscle tone: within normal.


Muscle strength: 5


DTR: 2


Plantar reflex: Flexor response bilaterally 


Gait: not examined in bed. 


Sensory exam: no abnormal findings.


No cerebellar signs elicited.


F-T-N test fine.





Objective


Objective





Vital Signs








  Date Time  Temp Pulse Resp B/P (MAP) Pulse Ox O2 Delivery O2 Flow Rate FiO2


 


10/4/19 15:18      Nasal Cannula 2.0 


 


10/4/19 11:00 97.8 90 16 132/55 (80) 94   





 97.8       














Intake and Output 


 


 10/4/19





 07:00


 


Intake Total 710 ml


 


Balance 710 ml


 


 


 


Intake Oral 710 ml


 


# Voids 1











Vitals Signs


Vitals





                          VS - Last 72 Hours, by Label








  Date Time  Temp Pulse Resp B/P (MAP) Pulse Ox O2 Delivery O2 Flow Rate FiO2


 


10/4/19 15:18      Nasal Cannula 2.0 


 


10/4/19 11:04      Nasal Cannula 2.0 


 


10/4/19 11:00 97.8 90 16 132/55 (80) 94 Nasal Cannula 2.0 





 97.8       


 


10/4/19 08:00      Nasal Cannula 2.0 


 


10/4/19 07:08     95 Nasal Cannula 2.0 


 


10/4/19 07:00 98.0 88 16 147/51 (83) 93 Nasal Cannula 2.0 





 98.0       


 


10/4/19 03:00 98.4 80 16 125/57 (79) 95   





 98.4       


 


10/3/19 23:00 98.4 92 18 134/60 (84) 95   





 98.4       


 


10/3/19 20:00      Nasal Cannula 2.0 


 


10/3/19 19:56      Nasal Cannula 2.0 


 


10/3/19 19:00 98.9 82 18 126/50 (75) 95   





 98.9       


 


10/3/19 14:04 98.6 86 16 147/47 (80) 96 Nasal Cannula 2.0 





 98.6       


 


10/3/19 11:35      Nasal Cannula 2.0 


 


10/3/19 11:00 97.6 85 20 158/59 (92) 96 Nasal Cannula 2.0 





 97.6       


 


10/3/19 08:00      Nasal Cannula 2.0 


 


10/3/19 07:48 98.2 82 18 134/51 (78) 96 Nasal Cannula 2.0 





 98.2       


 


10/3/19 07:40     99 Nasal Cannula 2.0 











Medication


Medications





Current Medications


Lactobacillus Rhamnosus (Culturelle) 1 cap BID PO  Last administered on 

10/4/19at 12:04;  Start 10/4/19 at 12:00


Levofloxacin (Levaquin) 250 mg Q24H PO  Last administered on 10/3/19at 17:50;  

Start 10/3/19 at 18:00





Comment


Review of Relevant


I have reviewed the following items miltno (where applicable) has been applied.











ALIX AVILA MD                  Oct 4, 2019 16:06

## 2019-10-04 NOTE — PDOC
Provider Note


Provider Note


vss, no temp, mod less wheezing and cough- mri clear re [place], neuro consult 

pending- cont same, will need home nebulizer











FLAQUITO MARTINEZ MD              Oct 4, 2019 08:35

## 2019-10-04 NOTE — NUR
SW consulted for home nebulizer. Discussed with RN, Rx for nebulizer is needed. Pt will also 
do 6 min walk to [facility] 02 needs. Both orders can be sent to an  provider prior to dc.

## 2019-10-04 NOTE — PDOC2
NEUROLOGY CONSULT


Date of Admission


Date of Admission


DATE: 10/4/19 


TIME: 15:51





Reason for Consult


Reason for Consult:


NEUROLOGY CONSULTATION


10-3-2019





IMPRESSION:


Left side facial pain for 3-4 months.


Leukocytosis.


Trigeminal neuralgia.


SOB.


Cough.


COPD.


HTN.


Carotid A stenosis 50%.


Smoking.





RECOMMENDATIONS/PLAN:


Increase Tegretol to 400 mg bid.


Continue Lyrica.


Continue Zocor.


Smoking cessation.


Brain MRI w/wo contrast plus trigeminal protocol.





HISTORY OF THE PRESENT ILLNESS:


74-year-old female with above medical symptoms for admission. She has been 

having intermittent severe pain in her left side of face lasting for about 15 

seconds each time multiple time a day for about 2 years but became persistent 

for about 3-4 months this time. She was treated with Tegretol but stated not 

help. No hearing loss or ataxia. 





PAST MEDICAL HISTORY:


COPD.


HTN.





PAST SURGERY HISTORY: 


LE surgery due to PVD.





ALLERGY:


NKDA





MEDICATIONS:


Refer to MAR





FAMILY HISTORY: 


Non contributory.





SOCIAL HISTORY: 


Lives at home.


Denies drinking, and illicit drug use.  


She smoked 2 pack a day in the past then 1/2 pack of cigarettes a day recently, 

for 50 years.  





REVIEW OF SYSTEMS:


Constitutional: No malnutrition, weight loss, cachexia.


Head: No traumatic brain or head injury.


Skin: No edema, or rash.


Ear: No infection.


Eyes: No vision loss or color blindness.


Nose: No bleeding or purulent discharges.


Hearing: No hearing decrease.


Neck: No injury.


Breast: No history of cancer, masses,or discharges.


Cardiac: HTN, PVD.


Pulmonary: COPD.


GI: No GI ulcer, GI bleeding.


Urinary/genital: UTI.


Endocrinologic: No cousin face, craniofacial dysmorphism, polydactyly.


Skeletomuscular: No muscular atrophy, deformity.


Neurological: see  HP.


Psychiatric: Denies drug use/abuse.


Otherwise, not pertinant14-point review of systems.





PHYSICAL EXAMINATION:   


General appearance is in chronic distress.  


HEENT:  Normocephalic and nontraumatic.  Eyes, nose, ears, and throat are 

unremarkable.  


Neck is supple. No lymphadenopathy. No crepitus. 


Cardiovascular:  S1, S2, regular rate and rhythm.  


Pulmonary:  Clear to auscultation bilaterally. 


Abdomen:  Bowel sounds are positive.      


Extremities:  No rash, lesions, or edema.  


No restriction of range of motion





NEUROLOGICAL  EXAMINATION:


Alert 


Oriented to time, place and person.


PERRL.


EOMI.


CN: no focal findings.


Muscle tone: within normal.


Muscle strength: 5


DTR: 2


Plantar reflex: Flexor response bilaterally 


Gait: not examined in bed. 


Sensory exam: no abnormal findings.


No cerebellar signs elicited.


F-T-N test fine.





Current Medications


Current Medications





Current Medications


Albuterol/ Ipratropium (Duoneb) 3 ml 1X  ONCE NEB  Last administered on 10/2/1

9at 19:53;  Start 10/2/19 at 19:45;  Stop 10/2/19 at 19:46;  Status DC


Methylprednisolone Sodium Succinate (SOLU-Medrol 125MG VIAL) 125 mg 1X  ONCE IV 

Last administered on 10/2/19at 20:48;  Start 10/2/19 at 20:30;  Stop 10/2/19 at 

20:31;  Status DC


Iohexol (Omnipaque 350 Mg/ml) 60 ml 1X  ONCE IV ;  Start 10/2/19 at 21:00;  Stop

10/2/19 at 21:01;  Status DC


Info (CONTRAST GIVEN -- Rx MONITORING) 1 each PRN DAILY  PRN MC SEE COMMENTS;  

Start 10/2/19 at 21:00;  Stop 10/4/19 at 20:59


Levofloxacin/ Dextrose 150 ml @  100 mls/hr 1X  ONCE IV  Last administered on 

10/3/19at 00:47;  Start 10/2/19 at 22:00;  Stop 10/2/19 at 23:29;  Status DC


Ondansetron HCl (Zofran) 4 mg PRN Q8HRS  PRN IV NAUSEA/VOMITING;  Start 10/2/19 

at 22:00;  Stop 10/3/19 at 21:59;  Status DC


Albuterol/ Ipratropium (Duoneb) 3 ml RTQID NEB  Last administered on 10/3/19at 

07:39;  Start 10/3/19 at 08:00;  Stop 10/3/19 at 08:02;  Status DC


Aspirin (Ecotrin) 81 mg DAILY PO  Last administered on 10/4/19at 09:14;  Start 

10/3/19 at 09:00


Carbamazepine (TEGretol) 200 mg BID PO  Last administered on 10/4/19at 09:14;  

Start 10/3/19 at 09:00


Pregabalin (Lyrica) 75 mg BID  PRN PO NEUROPATHIC PAIN;  Start 10/3/19 at 08:00;

 Stop 10/4/19 at 08:35;  Status DC


Methylprednisolone Sodium Succinate (SOLU-Medrol 40MG VIAL) 40 mg Q12HR IV  Last

administered on 10/4/19at 09:15;  Start 10/3/19 at 09:00


Albuterol/ Ipratropium (Duoneb) 3 ml RTQID NEB  Last administered on 10/4/19at 

15:17;  Start 10/3/19 at 12:00


Levofloxacin (Levaquin) 250 mg Q24H PO  Last administered on 10/3/19at 17:50;  

Start 10/3/19 at 18:00


Famotidine (Pepcid) 20 mg BID PO  Last administered on 10/4/19at 09:14;  Start 

10/3/19 at 09:00


Pregabalin (Lyrica) 75 mg BID PO  Last administered on 10/4/19at 09:14;  Start 

10/3/19 at 09:00


Gadoterate Meglumine (Dotarem) 12 ml 1X  ONCE IVP  Last administered on 

10/3/19at 15:19;  Start 10/3/19 at 15:00;  Stop 10/3/19 at 15:01;  Status DC


Lactobacillus Rhamnosus (Culturelle) 1 cap BID PO  Last administered on 

10/4/19at 12:04;  Start 10/4/19 at 12:00





Active Scripts


Active


Reported


Benzonatate 100 Mg Capsule 1 Cap PO TID PRN


Epitol (Carbamazepine) 200 Mg Tablet 200 Mg PO BID


Lyrica (Pregabalin) 75 Mg Capsule 1 Cap PO BID PRN


Gabapentin 600 Mg Tablet 600 Mg PO TID


Aspir 81 (Aspirin) 81 Mg Tablet.dr 81 Mg PO DAILY


Lisinopril-Hctz 10-12.5 Mg Tab (Lisinopril/Hydrochlorothiazide) 1 Each Tablet 1 

Tab PO DAILY


Women's Daily Multivitamin (Multivit With Calcium,Iron,Min) 1 Each Tablet 1 Each

PO DAILY


Calcium + Vitamin D Tablet (Calcium Carbonate/Vitamin D3) 1 Each Tablet 1 Each 

PO DAILY





Allergies


Allergies:





Allergies








Coded Allergies Type Severity Reaction Last Updated Verified


 


  No Known Drug Allergies    7/10/18 No











ROS


Review of System


The patient denies any associated fevers, chills, headache, ear pain, 

rhinorrhea, sore throat, stiff neck, productive cough, chest pain, shortness of 

breath, back or flank pain, abdominal pain, nausea, vomiting, diarrhea, co

nstipation, dysuria, rash, numbness, weakness, tingling, incontinence, 

difficulty  ambulating, or diaphoresis.





Physical Exam


Physical Exam


General: Well developed, well nourished, no acute distress, well appearing


HEENT:  Pupils equally round and reactive to light, EOMI, no discharge, normal 

conjunctiva


Neck:  Supple, no nuchal rigidity, no JVD, trachea midline, no tenderness


Cardiac:  RRR, no murmurs, no gallops, no rubs


Chest/Lungs:  CTAB, no wheeze, no rhonchi, no crackles


Abdomen: soft, non-distended, no guarding, no peritoneal signs, non-tender 


Back:  No tenderness


Extremities:  no edema, pulses intact, non-tender,capillary refill <3 sec 

bilateral upper and lower extremities,


Neuro:  Alert and oriented x 4, no focal deficits, normal speech





Vitals


Vitals:





Vital Signs








  Date Time  Temp Pulse Resp B/P (MAP) Pulse Ox O2 Delivery O2 Flow Rate FiO2


 


10/4/19 15:18      Nasal Cannula 2.0 


 


10/4/19 11:00 97.8 90 16 132/55 (80) 94   





 97.8       











Labs


Labs





Laboratory Tests








Test


 10/2/19


19:35 10/2/19


19:37 10/3/19


03:30 10/3/19


11:00


 


White Blood Count


 14.8 x10^3/uL


(4.0-11.0) 


 16.2 x10^3/uL


(4.0-11.0) 





 


Red Blood Count


 4.18 x10^6/uL


(3.50-5.40) 


 4.07 x10^6/uL


(3.50-5.40) 





 


Hemoglobin


 12.8 g/dL


(12.0-15.5) 


 12.4 g/dL


(12.0-15.5) 





 


Hematocrit


 37.6 %


(36.0-47.0) 


 36.8 %


(36.0-47.0) 





 


Mean Corpuscular Volume 90 fL ()   90 fL ()  


 


Mean Corpuscular Hemoglobin 31 pg (25-35)   31 pg (25-35)  


 


Mean Corpuscular Hemoglobin


Concent 34 g/dL


(31-37) 


 34 g/dL


(31-37) 





 


Red Cell Distribution Width


 14.5 %


(11.5-14.5) 


 14.2 %


(11.5-14.5) 





 


Platelet Count


 248 x10^3/uL


(140-400) 


 246 x10^3/uL


(140-400) 





 


Neutrophils (%) (Auto) 81 % (31-73)   91 % (31-73)  


 


Lymphocytes (%) (Auto) 12 % (24-48)   7 % (24-48)  


 


Monocytes (%) (Auto) 5 % (0-9)   2 % (0-9)  


 


Eosinophils (%) (Auto) 1 % (0-3)   0 % (0-3)  


 


Basophils (%) (Auto) 0 % (0-3)   0 % (0-3)  


 


Neutrophils # (Auto)


 12.0 x10^3/uL


(1.8-7.7) 


 14.7 x10^3/uL


(1.8-7.7) 





 


Lymphocytes # (Auto)


 1.8 x10^3/uL


(1.0-4.8) 


 1.2 x10^3/uL


(1.0-4.8) 





 


Monocytes # (Auto)


 0.8 x10^3/uL


(0.0-1.1) 


 0.2 x10^3/uL


(0.0-1.1) 





 


Eosinophils # (Auto)


 0.2 x10^3/uL


(0.0-0.7) 


 0.0 x10^3/uL


(0.0-0.7) 





 


Basophils # (Auto)


 0.1 x10^3/uL


(0.0-0.2) 


 0.0 x10^3/uL


(0.0-0.2) 





 


Sodium Level


 131 mmol/L


(136-145) 


 132 mmol/L


(136-145) 





 


Potassium Level


 4.4 mmol/L


(3.5-5.1) 


 4.2 mmol/L


(3.5-5.1) 





 


Chloride Level


 94 mmol/L


() 


 94 mmol/L


() 





 


Carbon Dioxide Level


 29 mmol/L


(21-32) 


 29 mmol/L


(21-32) 





 


Anion Gap 8 (6-14)   9 (6-14)  


 


Blood Urea Nitrogen


 16 mg/dL


(7-20) 


 14 mg/dL


(7-20) 





 


Creatinine


 1.0 mg/dL


(0.6-1.0) 


 1.1 mg/dL


(0.6-1.0) 





 


Estimated GFR


(Cockcroft-Gault) 53.9 


 


 48.3 


 





 


BUN/Creatinine Ratio 16 (6-20)   13 (6-20)  


 


Glucose Level


 105 mg/dL


(70-99) 


 145 mg/dL


(70-99) 





 


Calcium Level


 10.1 mg/dL


(8.5-10.1) 


 9.7 mg/dL


(8.5-10.1) 





 


Total Bilirubin


 0.3 mg/dL


(0.2-1.0) 


 0.3 mg/dL


(0.2-1.0) 





 


Aspartate Amino Transf


(AST/SGOT) 63 U/L (15-37) 


 


 41 U/L (15-37) 


 





 


Alanine Aminotransferase


(ALT/SGPT) 62 U/L (14-59) 


 


 51 U/L (14-59) 


 





 


Alkaline Phosphatase


 116 U/L


() 


 101 U/L


() 





 


Troponin I Quantitative


 < 0.017 ng/mL


(0.000-0.055) 


 


 





 


NT-Pro-B-Type Natriuretic


Peptide 445 pg/mL


(0-449) 


 


 





 


Total Protein


 7.3 g/dL


(6.4-8.2) 


 7.1 g/dL


(6.4-8.2) 





 


Albumin


 3.8 g/dL


(3.4-5.0) 


 3.2 g/dL


(3.4-5.0) 





 


Albumin/Globulin Ratio 1.1 (1.0-1.7)   0.8 (1.0-1.7)  


 


D-Dimer (Anneliese)


 


 1.16 ug/mlFEU


(0.00-0.50) 


 





 


Segmented Neutrophils %   90 % (35-66)  


 


Band Neutrophils %   1 % (0-9)  


 


Lymphocytes %   7 % (24-48)  


 


Monocytes %   2 % (0-10)  


 


Toxic Granulation   Slight  


 


Platelet Estimate


 


 


 Adequate


(ADEQUATE) 





 


Creatine Kinase


 


 


 46 U/L


() 





 


Erythrocyte Sedimentation Rate    30 (0-25) 

















ALIX AVILA MD                  Oct 4, 2019 16:03

## 2019-10-05 VITALS — DIASTOLIC BLOOD PRESSURE: 54 MMHG | SYSTOLIC BLOOD PRESSURE: 129 MMHG

## 2019-10-05 VITALS — DIASTOLIC BLOOD PRESSURE: 53 MMHG | SYSTOLIC BLOOD PRESSURE: 152 MMHG

## 2019-10-05 VITALS — SYSTOLIC BLOOD PRESSURE: 127 MMHG | DIASTOLIC BLOOD PRESSURE: 41 MMHG

## 2019-10-05 RX ADMIN — ASPIRIN SCH MG: 81 TABLET, COATED ORAL at 09:49

## 2019-10-05 RX ADMIN — Medication SCH CAP: at 09:49

## 2019-10-05 RX ADMIN — IPRATROPIUM BROMIDE AND ALBUTEROL SULFATE SCH ML: .5; 3 SOLUTION RESPIRATORY (INHALATION) at 07:35

## 2019-10-05 RX ADMIN — PREGABALIN SCH MG: 75 CAPSULE ORAL at 09:49

## 2019-10-05 NOTE — PDOC
Provider Note


Provider Note


[id number]











FLAQUITO MARTINEZ MD              Oct 5, 2019 09:41

## 2019-10-05 NOTE — DS
DATE OF DISCHARGE:  10/05/2019



HOSPITAL SUMMARY:  A 74-year-old patient with COPD and tobacco use, came in with

exacerbation of COPD.  Her chest x-ray and CTA were clear.  Brain MRI showed no

lesions to account for left hemifacial pain.  CBC and chemistry profile showed

mildly elevated liver function tests on admission with AST of 63 and they were

down to normal at discharge.  D-dimer was elevated at 1.16, but CTA showed no

lesions consistent with pulmonary emboli.  She was treated with IV steroids and

oral Levaquin has improved significantly within 48 hours.  Neurologic

consultation regarding her trigeminal neuralgia pain was done.  Dr. Olson ordered

the MRI and then recommended doubling the dose of carbamazepine, which was done

and she is comfortable to be followed as an outpatient.  She will get a home

nebulizer through the [facility] as well to resume breathing treatments at

home.



FINAL DIAGNOSES:

1.  Acute exacerbation of chronic obstructive pulmonary disease.

2.  Left trigeminal neuralgia.

3.  Transaminitis, resolved.



OPERATIONS, PROCEDURES, COMPLICATIONS:  None.



CONSULTATIONS:  Porfirio Olson MD



DISPOSITION:  She will take prednisone taper over the next 6 days, 5 days of

Levaquin 250 mg daily.  She will double her dose of carbamazepine to 400 mg

twice a day.  Continue other home meds the same and office followup in 1 week. 

She will receive a flu vaccine prior to dismissal as well.  Prognosis depends on

continued tobacco abuse.

 



______________________________

FLAQUITO MARTINEZ MD



DR:  LIYA/nts  JOB#:  [id number] / [id number]

DD:  10/05/2019 09:41  DT:  10/05/2019 09:47

## 2019-10-05 NOTE — NUR
DISCHARGE INSTRUCTIONS GIVEN, QUESTIONS AND CONCERNS ANSWERED, PATIENT VERBALIZED 
UNDERSTANDING OF DISCHARGE INFORMATION INCLUDING TAKING ALL MEDICATIONS AS INSTRUCTED AND 
FOLLOWING UP WITH HER PRIMARY PROVIDER IN 1-2 WEEKS AND  AS INSTRUCTED, ALL PERSONAL 
BELONGINGS GATHERED BY THE PATIENT AND PLACED IN BAGS PRIOR TO DISCHARGE.